# Patient Record
Sex: FEMALE | Race: WHITE | Employment: OTHER | URBAN - METROPOLITAN AREA
[De-identification: names, ages, dates, MRNs, and addresses within clinical notes are randomized per-mention and may not be internally consistent; named-entity substitution may affect disease eponyms.]

---

## 2019-05-22 ENCOUNTER — HOSPITAL ENCOUNTER (OUTPATIENT)
Dept: LAB | Age: 79
Discharge: HOME | End: 2019-05-22
Payer: MEDICARE

## 2019-05-22 PROCEDURE — 88360 TUMOR IMMUNOHISTOCHEM/MANUAL: CPT

## 2019-05-22 PROCEDURE — 88305 TISSUE EXAM BY PATHOLOGIST: CPT

## 2019-05-22 PROCEDURE — 88342 IMHCHEM/IMCYTCHM 1ST ANTB: CPT

## 2019-07-22 ENCOUNTER — HOSPITAL ENCOUNTER (OUTPATIENT)
Dept: PREADMISSION TESTING | Age: 79
Discharge: HOME | End: 2019-07-22
Payer: MEDICARE

## 2019-07-22 VITALS — HEIGHT: 62 IN | BODY MASS INDEX: 23.92 KG/M2 | WEIGHT: 130 LBS

## 2019-07-22 LAB
ALBUMIN SERPL-MCNC: 3.1 G/DL (ref 3.4–5)
ALBUMIN/GLOB SERPL: 1 {RATIO} (ref 0.8–1.7)
ALP SERPL-CCNC: 121 U/L (ref 45–117)
ALT SERPL-CCNC: 19 U/L (ref 13–56)
ANION GAP SERPL CALC-SCNC: 9 MMOL/L (ref 3–18)
AST SERPL-CCNC: 12 U/L (ref 10–38)
ATRIAL RATE: 74 BPM
BILIRUB SERPL-MCNC: 0.3 MG/DL (ref 0.2–1)
BUN SERPL-MCNC: 18 MG/DL (ref 7–18)
BUN/CREAT SERPL: 13 (ref 12–20)
CALCIUM SERPL-MCNC: 8.5 MG/DL (ref 8.5–10.1)
CALCULATED P AXIS, ECG09: 67 DEGREES
CALCULATED R AXIS, ECG10: 41 DEGREES
CALCULATED T AXIS, ECG11: 83 DEGREES
CHLORIDE SERPL-SCNC: 103 MMOL/L (ref 100–111)
CO2 SERPL-SCNC: 23 MMOL/L (ref 21–32)
CREAT SERPL-MCNC: 1.44 MG/DL (ref 0.6–1.3)
DIAGNOSIS, 93000: NORMAL
GLOBULIN SER CALC-MCNC: 3.1 G/DL (ref 2–4)
GLUCOSE SERPL-MCNC: 92 MG/DL (ref 74–99)
HCT VFR BLD AUTO: 36.1 % (ref 35–45)
HGB BLD-MCNC: 12.2 G/DL (ref 12–16)
P-R INTERVAL, ECG05: 200 MS
POTASSIUM SERPL-SCNC: 4 MMOL/L (ref 3.5–5.5)
PROT SERPL-MCNC: 6.2 G/DL (ref 6.4–8.2)
Q-T INTERVAL, ECG07: 400 MS
QRS DURATION, ECG06: 88 MS
QTC CALCULATION (BEZET), ECG08: 444 MS
SODIUM SERPL-SCNC: 135 MMOL/L (ref 136–145)
VENTRICULAR RATE, ECG03: 74 BPM

## 2019-07-22 PROCEDURE — 80053 COMPREHEN METABOLIC PANEL: CPT

## 2019-07-22 PROCEDURE — 85018 HEMOGLOBIN: CPT

## 2019-07-22 PROCEDURE — 93005 ELECTROCARDIOGRAM TRACING: CPT

## 2019-07-22 RX ORDER — AMLODIPINE BESYLATE 5 MG/1
5 TABLET ORAL DAILY
COMMUNITY

## 2019-07-22 RX ORDER — SODIUM CHLORIDE, SODIUM LACTATE, POTASSIUM CHLORIDE, CALCIUM CHLORIDE 600; 310; 30; 20 MG/100ML; MG/100ML; MG/100ML; MG/100ML
125 INJECTION, SOLUTION INTRAVENOUS CONTINUOUS
Status: CANCELLED | OUTPATIENT
Start: 2019-07-22

## 2019-07-22 RX ORDER — ASPIRIN 81 MG/1
81 TABLET ORAL DAILY
COMMUNITY

## 2019-07-22 RX ORDER — MELATONIN
1000 DAILY
COMMUNITY

## 2019-07-22 RX ORDER — ATORVASTATIN CALCIUM 80 MG/1
80 TABLET, FILM COATED ORAL
COMMUNITY

## 2019-07-22 RX ORDER — MIRTAZAPINE 30 MG/1
30 TABLET, FILM COATED ORAL DAILY
COMMUNITY

## 2019-07-22 RX ORDER — DOCUSATE SODIUM 100 MG/1
100 CAPSULE, LIQUID FILLED ORAL AS NEEDED
COMMUNITY

## 2019-07-22 RX ORDER — METOPROLOL SUCCINATE 100 MG/1
100 TABLET, EXTENDED RELEASE ORAL 2 TIMES DAILY
COMMUNITY

## 2019-07-22 NOTE — PERIOP NOTES
Patient does not meet criteria for special pop. No hx of sleep apnea or previous screening. Denies hx of MH. PCP is aware of surgery. Not participating in any research study or clinical trials.  Lab work and EKG done during PAT appt. 7-22-19

## 2019-07-31 ENCOUNTER — ANESTHESIA EVENT (OUTPATIENT)
Dept: SURGERY | Age: 79
End: 2019-07-31
Payer: MEDICARE

## 2019-07-31 NOTE — H&P
PATIENT: Sabiha Morales  YOB: 1940  DATE: 07/09/2019 1:15 PM   VISIT TYPE: Office Visit  HISTORIAN: self  _____________________________________________________________    Completed Orders (this encounter)  Order Interpretation Result Next Lab Date   Dietary management education, guidance, and counseling        Assessment/Plan  # Detail Type Description    1. Assessment Malignant neoplasm of central portion of left female breast (C50.112). Impression ILC L brst, initally measured 2.4 cm, G1, ER/WY + Her2 neg, discussed surgical options, suggest lumpectomy after seeing MRI results, risks and benefits, possible post op RT and/or hormone tx. Patient Plan left brst lumpectomy wire loc, and LN excision         2. Assessment Body mass index (BMI) 22.0-22.9, adult (K71.26). Plan Orders Today's instructions / counseling include(s) Dietary management education, guidance, and counseling. This 78year old female presents for Breast cancer. History of Present Illness:  1. Breast cancer   The initial visit date was 06/10/2019. The date of diagnosis was June 2019. The patient reports a performance level of 100% (Karnofsky Performance Scale). The location includes L upper central. The patient reports the pain level is 0/10. The problem is stable. Initial symptoms include abnl mammo. Self breast exam was performed. Mammogram was performed. Clinical breast exam was performed. Performance status is scored as a 1. The patient is restricted in some physical activity but is able to perform normal activities with effort. Pertinent negatives include bleeding, chest pain, cough, dyspnea, dysuria, fever, headache, night sweats and weight loss. Additional information includes 78year old female being seen today to discuss either a lumpectomy or mastectomy. Patient had a MRI at [facility]. on June 25 which showed minimal enhancement at clip, nl LN's and R brst.              PROBLEM LIST:   Problem List reviewed. Problem Description Onset Date Chronic Clinical Status Notes   Breast Ca  Y     Nuclear senile cataract 11/30/2012 Y     Posterior subcapsular polar senile cataract 11/30/2012 Y     Age related macular degeneration 11/30/2012 Y     Hypo-osmolality and or hyponatremia 08/09/2011 Y     Spinal stenosis of lumbar region 08/09/2011 Y  helps with medrol dosepak   Benign essential hypertension 07/06/2010 Y     Hyperlipidemia 07/06/2010 Y     Tobacco dependence syndrome 07/06/2010 Y     Gastroesophageal reflux 09/02/2014 N     Hiatal hernia 09/02/2014 N     Weight loss 09/02/2014 N     Colonic polyp 09/02/2014 N           Medical/Surgical/Interim History  Reviewed, no change. Last detailed document date:05/28/2019. Family History:  Reviewed, no changes. Last detailed document date:05/28/2019. Social History:  Reviewed, no changes. Last detailed document date: 05/28/2019. Medications (active prior to today)  Medication Instructions Start Date Stop Date Refilled Elsewhere   aspirin 81 mg chewable tablet chew 1 tablet by oral route  every day //   Y   Colace 100 mg capsule take 1 capsule by oral route 2 times every day //   Y   PreserVision AREDS 2 250 mg-200 unit-40 mg-1 mg capsule Take 1 tab by mouth once daily //   Y   Vitamin D3 1,000 unit tablet  12/01/2015 12/01/2015 N   nitroglycerin 0.3 mg sublingual tablet place 1 tablet by sublingual route at the first sign of an attack; no more than 3 tabs are recommended within a 15 minute period.  05/22/2017 05/22/2017 N   Zofran 4 mg tablet take 1 Tablet by oral route  every 6 hours as needed for n/v 06/22/2017 06/22/2017 N   Lubriderm Advanced Therapy lotion  10/16/2017   N   mirtazapine 30 mg tablet take 1 tablet by oral route  every day before bedtime 06/01/2018 06/01/2018 N   amlodipine 5 mg tablet take 1 tablet by oral route once daily 12/17/2018 12/17/2018 N   Toprol  mg tablet,extended release take 1 tablet by ORAL route 2 times every day 03/18/2019 03/18/2019 N   Lipitor 40 mg tablet take 1 tablet by oral route  every day 04/16/2019 04/16/2019 N   Shingrix (PF) 50 mcg/0.5 mL intramuscular suspension, kit inject 0.5 milliliter by intramuscular route as directed 04/16/2019   N   Percocet 5 mg-325 mg tablet take 1 tablet by oral route  every 6 hours as needed for M54.5 04/16/2019 04/16/2019 N   Miralax 17 gram/dose oral powder take (17G)  by oral route  every day mixed with 8 oz. water, juice, soda, coffee or tea 05/28/2019   N     Medication Reconciliation  Medications reconciled today. Medication Reviewed  Adherence Medication Name Sig Desc Elsewhere Status   taking as directed Lubriderm Advanced Therapy lotion  N Verified   taking as directed Vitamin D3 1,000 unit tablet  N Verified   taking as directed mirtazapine 30 mg tablet take 1 tablet by oral route  every day before bedtime N Verified   taking as directed Zofran 4 mg tablet take 1 Tablet by oral route  every 6 hours as needed for n/v N Verified   taking as directed PreserVision AREDS 2 250 mg-200 unit-40 mg-1 mg capsule Take 1 tab by mouth once daily Y Verified   taking as directed Colace 100 mg capsule take 1 capsule by oral route 2 times every day Y Verified   taking as directed aspirin 81 mg chewable tablet chew 1 tablet by oral route  every day Y Verified   taking as directed Lipitor 40 mg tablet take 1 tablet by oral route  every day N Verified   taking as directed Toprol  mg tablet,extended release take 1 tablet by ORAL route 2 times every day N Verified   taking as directed nitroglycerin 0.3 mg sublingual tablet place 1 tablet by sublingual route at the first sign of an attack; no more than 3 tabs are recommended within a 15 minute period.  N Verified   taking as directed amlodipine 5 mg tablet take 1 tablet by oral route once daily N Verified   taking as directed Shingrix (PF) 50 mcg/0.5 mL intramuscular suspension, kit inject 0.5 milliliter by intramuscular route as directed N Verified   taking as directed Percocet 5 mg-325 mg tablet take 1 tablet by oral route  every 6 hours as needed for M54.5 N Verified   taking as directed Miralax 17 gram/dose oral powder take (17G)  by oral route  every day mixed with 8 oz. water, juice, soda, coffee or tea N Verified     Allergies:  Ingredient Reaction (Severity) Medication Name Comment   ACETAMINOPHEN  Vicodin    HYDROCODONE BITARTRATE  Vicodin    NSAIDS (NON-STEROIDAL ANTI-INFLAMMATORY DRUG)      Reviewed, no changes. Review of Systems  System Neg/Pos Details   Constitutional Negative Fever, Night sweats and Weight loss. ENMT Negative Hearing loss, Tinnitus, Vertigo and Voice change. Eyes Negative Diplopia and Vision loss. Respiratory Negative Asthma, Cough, Dyspnea, Hemoptysis, Known TB exposure and Wheezing. Cardio Negative Chest pain, Claudication, Edema, Irregular heartbeat/palpitations and Thrombophlebitis. GI Negative Bloating, Dysphagia, Hemorrhoids, Jaundice and Reflux.  Negative Dysuria, Nocturia, Passage stone/gravel and Urinary incontinence. Endocrine Negative Cold intolerance and Goiter. Neuro Negative Headache and Syncope. Integumentary Negative Change in shape/size of mole(s) and Skin lesion. MS Negative Back pain and Bone/joint symptoms. Hema/Lymph Negative Easy bleeding and Easy bruising. Allergic/Immuno Negative Contact allergy and Contact dermatitis.      Vital Signs     Height  Time ft in cm Last Measured Height Position   1:24 PM 5.0 4.00 162.56 07/09/2019 Standing     Weight/BSA/BMI  Time lb oz kg Context BMI kg/m2 BSA m2   1:24 .80  58.876 dressed with shoes 22.28      Blood Pressure  Time BP mm/Hg Position Side Site Method Cuff Size   1:24 /74 sitting right arm manual adult large     Temperature/Pulse/Respiration  Time Temp F Temp C Temp Site Pulse/min Pattern Resp/ min   1:24 PM 97.60 36.44 oral 74 regular 16     Pulse Oximetry/FIO2  Time Pulse Ox (Rest %) Pulse Ox (Amb %) O2 Sat O2 L/Min Timing FiO2 % L/min Delivery Method Finger Probe   1:24 PM 98  RA      L Index     Pain Scale  Time Pain Score Method   1:24 PM 0/10 Numeric Pain Intensity Scale     Measured By  Time Measured by   1:24 PM Raulito Franco       Physical Exam:  Exam Findings Details   Constitutional Normal No acute distress. Well nourished. Well developed. Eyes Normal General - Right: Normal, Left: Normal. Sclera - Right: Normal, Left: Normal.   Neck Exam Comments LN's had been normal.   Neck Exam Normal Inspection - Normal.   Lymph Detail Comments normal cervical and axillary LN's. Lymph Detail Normal Anterior cervical. Posterior cervical.   Breast Comments cancer is not palpable   Respiratory Normal Cough - Absent. Effort - Normal.   Cardiovascular Normal Inspection - JVD: Absent. Heart rate - Regular rate. Rhythm - Regular. Skin * Inspection - General inspection: warm and dry. Musculoskeletal Normal Visual overview of all four extremities is normal. Gait - Normal.   Extremity Normal No Cyanosis. No Edema. Neurological Normal Level of consciousness - Normal. Orientation - Normal. Memory - Normal.   Psychiatric Normal Orientation - Oriented to time, place, person & situation. No agitation. Not anxious. Appropriate mood and affect.          Medications (added, continued, or stopped this visit):  Start Date Medication Directions PRN Status PRN Reason Instruction Stop Date   12/17/2018 amlodipine 5 mg tablet take 1 tablet by oral route once daily N       aspirin 81 mg chewable tablet chew 1 tablet by oral route  every day N       Colace 100 mg capsule take 1 capsule by oral route 2 times every day N      04/16/2019 Lipitor 40 mg tablet take 1 tablet by oral route  every day N      10/16/2017 Lubriderm Advanced Therapy lotion  N      05/28/2019 Miralax 17 gram/dose oral powder take (17G)  by oral route  every day mixed with 8 oz. water, juice, soda, coffee or tea N      06/01/2018 mirtazapine 30 mg tablet take 1 tablet by oral route  every day before bedtime N      05/22/2017 nitroglycerin 0.3 mg sublingual tablet place 1 tablet by sublingual route at the first sign of an attack; no more than 3 tabs are recommended within a 15 minute period.  N      04/16/2019 Percocet 5 mg-325 mg tablet take 1 tablet by oral route  every 6 hours as needed for M54.5 N M54.5      PreserVision AREDS 2 250 mg-200 unit-40 mg-1 mg capsule Take 1 tab by mouth once daily N      04/16/2019 Shingrix (PF) 50 mcg/0.5 mL intramuscular suspension, kit inject 0.5 milliliter by intramuscular route as directed N      03/18/2019 Toprol  mg tablet,extended release take 1 tablet by ORAL route 2 times every day N      12/01/2015 Vitamin D3 1,000 unit tablet  N      06/22/2017 Zofran 4 mg tablet take 1 Tablet by oral route  every 6 hours as needed for n/v N          Active Patient Care Team Members    Name Contact Agency Type Support Role Relationship Active Date Inactive Date [address]       Orthopedic Surgery   [place] [address]   Patient provider PCP   Internal Medicine   Paulo Green   encounter provider    Physical Therapy   Amaryllis Matt   encounter provider    Gastroenterology   Elenaderella Amen   encounter provider    Physicians Assistant       Provider: Jose Coelho MD 07/09/2019 01:15 PM  Document generated by:  Jose Coelho 07/10/2019 12:13 PM                           Electronically signed by Jose Coelho MD on 07/18/2019 07:20 PM

## 2019-08-01 ENCOUNTER — ANESTHESIA (OUTPATIENT)
Dept: SURGERY | Age: 79
End: 2019-08-01
Payer: MEDICARE

## 2019-08-01 ENCOUNTER — HOSPITAL ENCOUNTER (OUTPATIENT)
Age: 79
Setting detail: OUTPATIENT SURGERY
Discharge: HOME | End: 2019-08-01
Attending: SURGERY | Admitting: SURGERY
Payer: MEDICARE

## 2019-08-01 ENCOUNTER — APPOINTMENT (OUTPATIENT)
Dept: NUCLEAR MEDICINE | Age: 79
End: 2019-08-01
Attending: SURGERY
Payer: MEDICARE

## 2019-08-01 VITALS
OXYGEN SATURATION: 96 % | RESPIRATION RATE: 16 BRPM | SYSTOLIC BLOOD PRESSURE: 178 MMHG | DIASTOLIC BLOOD PRESSURE: 70 MMHG | HEIGHT: 62 IN | BODY MASS INDEX: 23.61 KG/M2 | HEART RATE: 87 BPM | TEMPERATURE: 97.1 F | WEIGHT: 128.31 LBS

## 2019-08-01 DIAGNOSIS — Z85.3 HISTORY OF LEFT BREAST CANCER: ICD-10-CM

## 2019-08-01 PROCEDURE — 74011250636 HC RX REV CODE- 250/636: Performed by: SPECIALIST

## 2019-08-01 PROCEDURE — 74011250636 HC RX REV CODE- 250/636

## 2019-08-01 PROCEDURE — 77030002933 HC SUT MCRYL J&J -A: Performed by: SURGERY

## 2019-08-01 PROCEDURE — 74011250637 HC RX REV CODE- 250/637: Performed by: SPECIALIST

## 2019-08-01 PROCEDURE — 74011000250 HC RX REV CODE- 250: Performed by: SURGERY

## 2019-08-01 PROCEDURE — 74011000250 HC RX REV CODE- 250

## 2019-08-01 PROCEDURE — 74011250636 HC RX REV CODE- 250/636: Performed by: SURGERY

## 2019-08-01 PROCEDURE — 77030002996 HC SUT SLK J&J -A: Performed by: SURGERY

## 2019-08-01 PROCEDURE — 76210000063 HC OR PH I REC FIRST 0.5 HR: Performed by: SURGERY

## 2019-08-01 PROCEDURE — 88342 IMHCHEM/IMCYTCHM 1ST ANTB: CPT

## 2019-08-01 PROCEDURE — 77030011267 HC ELECTRD BLD COVD -A: Performed by: SURGERY

## 2019-08-01 PROCEDURE — 77030040361 HC SLV COMPR DVT MDII -B: Performed by: SURGERY

## 2019-08-01 PROCEDURE — 77030018836 HC SOL IRR NACL ICUM -A: Performed by: SURGERY

## 2019-08-01 PROCEDURE — 77030012508 HC MSK AIRWY LMA AMBU -A: Performed by: SPECIALIST

## 2019-08-01 PROCEDURE — 88305 TISSUE EXAM BY PATHOLOGIST: CPT

## 2019-08-01 PROCEDURE — 77030010512 HC APPL CLP LIG J&J -C: Performed by: SURGERY

## 2019-08-01 PROCEDURE — 88307 TISSUE EXAM BY PATHOLOGIST: CPT

## 2019-08-01 PROCEDURE — 76210000021 HC REC RM PH II 0.5 TO 1 HR: Performed by: SURGERY

## 2019-08-01 PROCEDURE — 76010000138 HC OR TIME 0.5 TO 1 HR: Performed by: SURGERY

## 2019-08-01 PROCEDURE — 76060000032 HC ANESTHESIA 0.5 TO 1 HR: Performed by: SURGERY

## 2019-08-01 PROCEDURE — 77030020782 HC GWN BAIR PAWS FLX 3M -B: Performed by: SURGERY

## 2019-08-01 PROCEDURE — A9541 TC99M SULFUR COLLOID: HCPCS

## 2019-08-01 RX ORDER — GLYCOPYRROLATE 0.2 MG/ML
INJECTION INTRAMUSCULAR; INTRAVENOUS AS NEEDED
Status: DISCONTINUED | OUTPATIENT
Start: 2019-08-01 | End: 2019-08-01 | Stop reason: HOSPADM

## 2019-08-01 RX ORDER — OXYCODONE AND ACETAMINOPHEN 5; 325 MG/1; MG/1
1 TABLET ORAL
Qty: 20 TAB | Refills: 0 | Status: SHIPPED | OUTPATIENT
Start: 2019-08-01 | End: 2019-08-04

## 2019-08-01 RX ORDER — ONDANSETRON 2 MG/ML
4 INJECTION INTRAMUSCULAR; INTRAVENOUS ONCE
Status: DISCONTINUED | OUTPATIENT
Start: 2019-08-01 | End: 2019-08-01 | Stop reason: HOSPADM

## 2019-08-01 RX ORDER — FENTANYL CITRATE 50 UG/ML
25 INJECTION, SOLUTION INTRAMUSCULAR; INTRAVENOUS AS NEEDED
Status: DISCONTINUED | OUTPATIENT
Start: 2019-08-01 | End: 2019-08-01 | Stop reason: HOSPADM

## 2019-08-01 RX ORDER — LIDOCAINE HYDROCHLORIDE 20 MG/ML
INJECTION, SOLUTION EPIDURAL; INFILTRATION; INTRACAUDAL; PERINEURAL AS NEEDED
Status: DISCONTINUED | OUTPATIENT
Start: 2019-08-01 | End: 2019-08-01 | Stop reason: HOSPADM

## 2019-08-01 RX ORDER — FENTANYL CITRATE 50 UG/ML
50 INJECTION, SOLUTION INTRAMUSCULAR; INTRAVENOUS
Status: DISCONTINUED | OUTPATIENT
Start: 2019-08-01 | End: 2019-08-01 | Stop reason: HOSPADM

## 2019-08-01 RX ORDER — FENTANYL CITRATE 50 UG/ML
INJECTION, SOLUTION INTRAMUSCULAR; INTRAVENOUS AS NEEDED
Status: DISCONTINUED | OUTPATIENT
Start: 2019-08-01 | End: 2019-08-01 | Stop reason: HOSPADM

## 2019-08-01 RX ORDER — ACETAMINOPHEN 500 MG
1000 TABLET ORAL ONCE
Status: COMPLETED | OUTPATIENT
Start: 2019-08-01 | End: 2019-08-01

## 2019-08-01 RX ORDER — MIDAZOLAM HYDROCHLORIDE 1 MG/ML
INJECTION, SOLUTION INTRAMUSCULAR; INTRAVENOUS AS NEEDED
Status: DISCONTINUED | OUTPATIENT
Start: 2019-08-01 | End: 2019-08-01 | Stop reason: HOSPADM

## 2019-08-01 RX ORDER — MAGNESIUM SULFATE 100 %
4 CRYSTALS MISCELLANEOUS AS NEEDED
Status: DISCONTINUED | OUTPATIENT
Start: 2019-08-01 | End: 2019-08-01 | Stop reason: HOSPADM

## 2019-08-01 RX ORDER — KETOROLAC TROMETHAMINE 30 MG/ML
15 INJECTION, SOLUTION INTRAMUSCULAR; INTRAVENOUS
Status: DISCONTINUED | OUTPATIENT
Start: 2019-08-01 | End: 2019-08-01 | Stop reason: HOSPADM

## 2019-08-01 RX ORDER — SODIUM CHLORIDE, SODIUM LACTATE, POTASSIUM CHLORIDE, CALCIUM CHLORIDE 600; 310; 30; 20 MG/100ML; MG/100ML; MG/100ML; MG/100ML
125 INJECTION, SOLUTION INTRAVENOUS CONTINUOUS
Status: DISCONTINUED | OUTPATIENT
Start: 2019-08-01 | End: 2019-08-01 | Stop reason: HOSPADM

## 2019-08-01 RX ORDER — NALOXONE HYDROCHLORIDE 0.4 MG/ML
0.1 INJECTION, SOLUTION INTRAMUSCULAR; INTRAVENOUS; SUBCUTANEOUS AS NEEDED
Status: DISCONTINUED | OUTPATIENT
Start: 2019-08-01 | End: 2019-08-01 | Stop reason: HOSPADM

## 2019-08-01 RX ORDER — HYDROMORPHONE HYDROCHLORIDE 2 MG/ML
0.2 INJECTION, SOLUTION INTRAMUSCULAR; INTRAVENOUS; SUBCUTANEOUS
Status: DISCONTINUED | OUTPATIENT
Start: 2019-08-01 | End: 2019-08-01 | Stop reason: HOSPADM

## 2019-08-01 RX ORDER — ONDANSETRON 2 MG/ML
INJECTION INTRAMUSCULAR; INTRAVENOUS AS NEEDED
Status: DISCONTINUED | OUTPATIENT
Start: 2019-08-01 | End: 2019-08-01 | Stop reason: HOSPADM

## 2019-08-01 RX ORDER — PROPOFOL 10 MG/ML
INJECTION, EMULSION INTRAVENOUS AS NEEDED
Status: DISCONTINUED | OUTPATIENT
Start: 2019-08-01 | End: 2019-08-01 | Stop reason: HOSPADM

## 2019-08-01 RX ORDER — SODIUM CHLORIDE 9 MG/ML
125 INJECTION, SOLUTION INTRAVENOUS CONTINUOUS
Status: DISCONTINUED | OUTPATIENT
Start: 2019-08-01 | End: 2019-08-01 | Stop reason: HOSPADM

## 2019-08-01 RX ORDER — OXYCODONE AND ACETAMINOPHEN 5; 325 MG/1; MG/1
1 TABLET ORAL AS NEEDED
Status: DISCONTINUED | OUTPATIENT
Start: 2019-08-01 | End: 2019-08-01 | Stop reason: HOSPADM

## 2019-08-01 RX ORDER — DEXAMETHASONE SODIUM PHOSPHATE 4 MG/ML
4 INJECTION, SOLUTION INTRA-ARTICULAR; INTRALESIONAL; INTRAMUSCULAR; INTRAVENOUS; SOFT TISSUE ONCE
Status: COMPLETED | OUTPATIENT
Start: 2019-08-01 | End: 2019-08-01

## 2019-08-01 RX ORDER — BUPIVACAINE HYDROCHLORIDE 5 MG/ML
INJECTION, SOLUTION EPIDURAL; INTRACAUDAL AS NEEDED
Status: DISCONTINUED | OUTPATIENT
Start: 2019-08-01 | End: 2019-08-01 | Stop reason: HOSPADM

## 2019-08-01 RX ADMIN — ACETAMINOPHEN 1000 MG: 500 TABLET ORAL at 12:01

## 2019-08-01 RX ADMIN — SODIUM CHLORIDE, SODIUM LACTATE, POTASSIUM CHLORIDE, AND CALCIUM CHLORIDE 125 ML/HR: 600; 310; 30; 20 INJECTION, SOLUTION INTRAVENOUS at 12:01

## 2019-08-01 RX ADMIN — SODIUM CHLORIDE, SODIUM LACTATE, POTASSIUM CHLORIDE, AND CALCIUM CHLORIDE 1000 ML: 600; 310; 30; 20 INJECTION, SOLUTION INTRAVENOUS at 12:04

## 2019-08-01 RX ADMIN — FENTANYL CITRATE 100 MCG: 50 INJECTION, SOLUTION INTRAMUSCULAR; INTRAVENOUS at 14:42

## 2019-08-01 RX ADMIN — MIDAZOLAM HYDROCHLORIDE 2 MG: 1 INJECTION, SOLUTION INTRAMUSCULAR; INTRAVENOUS at 14:47

## 2019-08-01 RX ADMIN — LIDOCAINE HYDROCHLORIDE 60 MG: 20 INJECTION, SOLUTION EPIDURAL; INFILTRATION; INTRACAUDAL; PERINEURAL at 14:42

## 2019-08-01 RX ADMIN — ONDANSETRON 4 MG: 2 INJECTION INTRAMUSCULAR; INTRAVENOUS at 14:44

## 2019-08-01 RX ADMIN — GLYCOPYRROLATE 0.2 MG: 0.2 INJECTION INTRAMUSCULAR; INTRAVENOUS at 14:47

## 2019-08-01 RX ADMIN — DEXAMETHASONE SODIUM PHOSPHATE 4 MG: 4 INJECTION, SOLUTION INTRAMUSCULAR; INTRAVENOUS at 12:02

## 2019-08-01 RX ADMIN — PROPOFOL 150 MG: 10 INJECTION, EMULSION INTRAVENOUS at 14:42

## 2019-08-01 NOTE — ANESTHESIA POSTPROCEDURE EVALUATION
Post-Anesthesia Evaluation and Assessment    Cardiovascular Function/Vital Signs  Visit Vitals  /64   Pulse 84   Temp 36.1 °C (97 °F)   Resp 10   Ht 5' 2\" (1.575 m)   Wt 58.2 kg (128 lb 5 oz)   SpO2 98%   BMI 23.47 kg/m²       Patient is status post Procedure(s):  LEFT BREAST LUMPECTOMY AND EXCISION LEFT AXILLARY LYMPH NODES. Nausea/Vomiting: Controlled. Postoperative hydration reviewed and adequate. Pain:  Pain Scale 1: Numeric (0 - 10) (08/01/19 1549)  Pain Intensity 1: 0 (08/01/19 1549)   Managed. Neurological Status:   Neuro (WDL): Exceptions to WDL(unsteady gait) (08/01/19 1155)   At baseline. Mental Status and Level of Consciousness: Arousable. Pulmonary Status:   O2 Device: Nasal cannula (08/01/19 2769)   Adequate oxygenation and airway patent. Complications related to anesthesia: None    Post-anesthesia assessment completed. No concerns. Patient has met all discharge requirements.     Signed By: Isabel Issa CRNA    August 1, 2019

## 2019-08-01 NOTE — INTERVAL H&P NOTE
H&P Update: 
Niurka Montano was seen and examined. History and physical has been reviewed. The patient has been examined. There have been no significant clinical changes since the completion of the originally dated History and Physical. 
Patient identified by surgeon; surgical site was confirmed by patient and surgeon.

## 2019-08-01 NOTE — ANESTHESIA PREPROCEDURE EVALUATION
Relevant Problems   No relevant active problems       Anesthetic History   No history of anesthetic complications     Pertinent negatives: No PONV  Comments: Colonoscopy EGD -> shaking     Review of Systems / Medical History  Patient summary reviewed, nursing notes reviewed and pertinent labs reviewed    Pulmonary          Smoker (did not smoke today)    Pertinent negatives: No COPD and asthma     Neuro/Psych       CVA: no residual symptoms  TIA and psychiatric history     Cardiovascular    Hypertension: well controlled          CAD and cardiac stents      Comments: LICA 57-29% stenosis    mesenteric ischemia. 70% stenosis. Moderate to severe inferior and inferolateral ischemia. Deemed not a candidate for CABG due to unacceptable risk of mesenteric ischemia/ infarction. Moderate diastolic dysfunction   GI/Hepatic/Renal  Within defined limits           Pertinent negatives: No GERD, liver disease and renal disease   Endo/Other        Cancer and anemia  Pertinent negatives: No diabetes   Other Findings   Comments: etoh neg         Physical Exam    Airway  Mallampati: III  TM Distance: 4 - 6 cm  Neck ROM: decreased range of motion   Mouth opening: Diminished (comment)     Cardiovascular               Dental    Dentition: Upper partial plate  Comments: Poor dentition, kevin lower front. Pulmonary                 Abdominal         Other Findings            Anesthetic Plan    ASA: 4  Anesthesia type: general          Induction: Intravenous  Anesthetic plan and risks discussed with: Patient      Have IV NTG nearby.

## 2019-08-01 NOTE — ROUTINE PROCESS
TRANSFER - OUT REPORT: 
 
Verbal report given to Sheryl Long on Deja Viveros  being transferred to radiology) for ordered procedure Report consisted of patients Situation, Background, Assessment and  
Recommendations(SBAR). Information from the following report(s) SBAR was reviewed with the receiving nurse. Lines:  
Peripheral IV 08/01/19 Right Hand (Active) Site Assessment Clean, dry, & intact 8/1/2019 12:00 PM  
Phlebitis Assessment 0 8/1/2019 12:00 PM  
Infiltration Assessment 0 8/1/2019 12:00 PM  
Dressing Status Clean, dry, & intact 8/1/2019 12:00 PM  
Dressing Type Tape;Transparent 8/1/2019 12:00 PM  
Hub Color/Line Status Pink; Infusing;Patent 8/1/2019 12:00 PM  
  
 
Opportunity for questions and clarification was provided. Patient transported with: 
 [employer]

## 2019-08-01 NOTE — INTERVAL H&P NOTE
H&P Update: 
Deja Viveros was seen and examined. History and physical has been reviewed. The patient has been examined. There have been no significant clinical changes since the completion of the originally dated History and Physical. 
Patient identified by surgeon; surgical site was confirmed by patient and surgeon.

## 2019-08-01 NOTE — DISCHARGE INSTRUCTIONS
DISCHARGE SUMMARY from Nurse    PATIENT INSTRUCTIONS:    After general anesthesia or intravenous sedation, for 24 hours or while taking prescription Narcotics:  · Limit your activities  · Do not drive and operate hazardous machinery  · Do not make important personal or business decisions  · Do  not drink alcoholic beverages  · If you have not urinated within 8 hours after discharge, please contact your surgeon on call. Report the following to your surgeon:  · Excessive pain, swelling, redness or odor of or around the surgical area  · Temperature over 100.5  · Nausea and vomiting lasting longer than 4 hours or if unable to take medications  · Any signs of decreased circulation or nerve impairment to extremity: change in color, persistent  numbness, tingling, coldness or increase pain  · Any questions    What to do at Home:  Recommended activity: Activity as tolerated and Ambulate in house. If you experience any of the following symptoms as highlighted above, please follow up with Dr. Vitale Ask. Regular diet as tolerated. Increase fluid intake at home. Dressing may be removed in 2-days. You may shower in 2-days, but do not soak in water; no pools, baths, or hot tubs. No heavy lifting or strenuous activity with left arm. Use an ice pack to surgical site to reduce pain and swelling. If using narcotic pain medication, take a stool softener to prevent constipation. Follow up with Dr. Vitale Ask in 1-week. *  Please give a list of your current medications to your Primary Care Provider. *  Please update this list whenever your medications are discontinued, doses are      changed, or new medications (including over-the-counter products) are added. *  Please carry medication information at all times in case of emergency situations.     These are general instructions for a healthy lifestyle:    No smoking/ No tobacco products/ Avoid exposure to second hand smoke  Surgeon General's Warning:  Quitting smoking now greatly reduces serious risk to your health. Obesity, smoking, and sedentary lifestyle greatly increases your risk for illness    A healthy diet, regular physical exercise & weight monitoring are important for maintaining a healthy lifestyle    You may be retaining fluid if you have a history of heart failure or if you experience any of the following symptoms:  Weight gain of 3 pounds or more overnight or 5 pounds in a week, increased swelling in our hands or feet or shortness of breath while lying flat in bed. Please call your doctor as soon as you notice any of these symptoms; do not wait until your next office visit. The discharge information has been reviewed with the patient and caregiver. The patient and caregiver verbalized understanding. Discharge medications reviewed with the patient and caregiver and appropriate educational materials and side effects teaching were provided. Patient armband removed and shredded.     ___________________________________________________________________________________________________________________________________

## 2019-08-01 NOTE — PERIOP NOTES
Reviewed PTA medication list with patient/caregiver and patient/caregiver denies any additional medications. Patient admits to having a responsible adult care for them for at least 24 hours after surgery.     Permission granted by patient for a dual skin assessment. Dual skin assessment completed by Moni Ernst RN and Ricky Perez.

## 2019-08-01 NOTE — PERIOP NOTES
Report given to Kinza Rm, [facility]. Patient transferring to Phase II for discharge.

## 2019-08-02 NOTE — OP NOTES
[facility] [place]  OPERATIVE REPORT    Name:  Ashanti Gutierrez  MR#:   [id number]  :  1940  ACCOUNT #:  [de-identified]  DATE OF SERVICE:  2019    PREOPERATIVE DIAGNOSIS:  Left breast cancer. POSTOPERATIVE DIAGNOSIS:  Left breast cancer. PROCEDURE PERFORMED:  Left breast lumpectomy and excision of left axillary lymph nodes. SURGEON:  Scott Serrano MD    FIRST ASSISTANT:  Billie Bell. ANESTHESIA:  General and local.    COMPLICATIONS:  No complications. SPECIMENS REMOVED:  Left breast lumpectomy with inferior margin and left axillary lymph nodes. DRAINS:  No drains. IMPLANTS:  No implants. ESTIMATED BLOOD LOSS:  3 mL. INDICATIONS FOR PROCEDURE:  This is a 68-year-old female with an abnormal mammogram and a biopsy showing invasive lobular cancer. It measured 2.2 cm by MRI. She is brought to the operating room for lumpectomy and excision of lymph nodes. DESCRIPTION OF PROCEDURE:  Prior to coming to the operating room, the patient had wire localization of the breast mass. Nuclear medicine was injected as well. The patient was brought to the operating room, placed on the table in a supine position. After placing monitors and adequate general anesthesia, the left breast and axilla were prepped and draped in the usual sterile fashion. SCD hoses were placed preoperatively; 3 mL of Lymphazurin was injected in the dermis in the left upper outer nipple-areolar complex. Incision was made in the left upper outer quadrant of the breast through the skin down to subcutaneous tissue. The localization wire was identified and brought through the incision. The breast tissue surrounding the wire was excised with the electrocautery. It was marked with a short suture at the anterior margin and the long suture was the lateral margin. It was sent to Pathology for gross margins. They were grossly clear by 3 mm.   An additional inferior margin was taken and sent to Pathology for permanent sectioning. The silk suture was placed on the new outer margin. Marcaine was injected locally. There was good hemostasis with the electrocautery. Subcutaneous tissue was reapproximated with interrupted 3-0 Monocryl suture, and 4-0 Monocryl was used to reapproximate the skin. An incision was made in the left axilla through the skin down to subcutaneous tissue. Then, using electrocautery dissection, the lymphatic channels were identified, clipped, and divided. Blue lymph nodes were dissected out. They were radioactive. They were sent to Pathology in formalin. There was good hemostasis with the electrocautery. Subcutaneous tissue was reapproximated with interrupted 3-0 Monocryl suture, and 4-0 Monocryl was used to reapproximate the skin. Steri-Strips were applied and the wounds were dressed sterilely. The sponge, instrument, and needle count was correct at the end of procedure.       Sidra Villareal MD      [id number]  D:  08/01/2019 20:52  T:  08/01/2019 21:00  JOB #:  [id number]

## 2019-09-10 PROBLEM — C50.919 BREAST CANCER (HCC): Status: ACTIVE | Noted: 2019-09-10

## 2021-01-06 ENCOUNTER — HOSPITAL ENCOUNTER (OUTPATIENT)
Dept: LAB | Age: 81
Discharge: HOME | End: 2021-01-06
Payer: MEDICARE

## 2021-01-06 PROCEDURE — 88342 IMHCHEM/IMCYTCHM 1ST ANTB: CPT

## 2021-01-06 PROCEDURE — 88305 TISSUE EXAM BY PATHOLOGIST: CPT

## 2021-01-06 PROCEDURE — 88360 TUMOR IMMUNOHISTOCHEM/MANUAL: CPT

## 2021-01-07 PROCEDURE — 88305 TISSUE EXAM BY PATHOLOGIST: CPT

## 2021-02-03 ENCOUNTER — TRANSCRIBE ORDER (OUTPATIENT)
Dept: SCHEDULING | Age: 81
End: 2021-02-03

## 2021-02-03 DIAGNOSIS — C50.112 MALIGNANT NEOPLASM OF CENTRAL PORTION OF LEFT FEMALE BREAST (HCC): Primary | ICD-10-CM

## 2021-02-09 ENCOUNTER — HOSPITAL ENCOUNTER (OUTPATIENT)
Dept: PREADMISSION TESTING | Age: 81
Discharge: HOME | End: 2021-02-09
Payer: MEDICARE

## 2021-02-09 ENCOUNTER — TRANSCRIBE ORDER (OUTPATIENT)
Dept: REGISTRATION | Age: 81
End: 2021-02-09

## 2021-02-09 DIAGNOSIS — C50.912 MALIGNANT NEOPLASM OF LEFT BREAST (HCC): Primary | ICD-10-CM

## 2021-02-09 DIAGNOSIS — C50.912 MALIGNANT NEOPLASM OF LEFT BREAST (HCC): ICD-10-CM

## 2021-02-09 LAB
ALBUMIN SERPL-MCNC: 2.8 G/DL (ref 3.4–5)
ALBUMIN/GLOB SERPL: 0.8 {RATIO} (ref 0.8–1.7)
ALP SERPL-CCNC: 126 U/L (ref 45–117)
ALT SERPL-CCNC: 21 U/L (ref 13–56)
ANION GAP SERPL CALC-SCNC: 7 MMOL/L (ref 3–18)
AST SERPL-CCNC: 13 U/L (ref 10–38)
BASOPHILS # BLD: 0 K/UL (ref 0–0.1)
BASOPHILS NFR BLD: 0 % (ref 0–2)
BILIRUB SERPL-MCNC: 0.3 MG/DL (ref 0.2–1)
BUN SERPL-MCNC: 14 MG/DL (ref 7–18)
BUN/CREAT SERPL: 12 (ref 12–20)
CALCIUM SERPL-MCNC: 8.8 MG/DL (ref 8.5–10.1)
CHLORIDE SERPL-SCNC: 86 MMOL/L (ref 100–111)
CO2 SERPL-SCNC: 30 MMOL/L (ref 21–32)
CREAT SERPL-MCNC: 1.15 MG/DL (ref 0.6–1.3)
DIFFERENTIAL METHOD BLD: ABNORMAL
EOSINOPHIL # BLD: 0.2 K/UL (ref 0–0.4)
EOSINOPHIL NFR BLD: 2 % (ref 0–5)
ERYTHROCYTE [DISTWIDTH] IN BLOOD BY AUTOMATED COUNT: 13.3 % (ref 11.6–14.5)
GLOBULIN SER CALC-MCNC: 3.6 G/DL (ref 2–4)
GLUCOSE SERPL-MCNC: 103 MG/DL (ref 74–99)
HCT VFR BLD AUTO: 34.9 % (ref 35–45)
HGB BLD-MCNC: 12.2 G/DL (ref 12–16)
LYMPHOCYTES # BLD: 1.6 K/UL (ref 0.9–3.6)
LYMPHOCYTES NFR BLD: 16 % (ref 21–52)
MCH RBC QN AUTO: 32 PG (ref 24–34)
MCHC RBC AUTO-ENTMCNC: 35 G/DL (ref 31–37)
MCV RBC AUTO: 91.6 FL (ref 74–97)
MONOCYTES # BLD: 0.9 K/UL (ref 0.05–1.2)
MONOCYTES NFR BLD: 9 % (ref 3–10)
NEUTS SEG # BLD: 7.1 K/UL (ref 1.8–8)
NEUTS SEG NFR BLD: 73 % (ref 40–73)
PLATELET # BLD AUTO: 256 K/UL (ref 135–420)
PMV BLD AUTO: 9.4 FL (ref 9.2–11.8)
POTASSIUM SERPL-SCNC: 3.2 MMOL/L (ref 3.5–5.5)
PROT SERPL-MCNC: 6.4 G/DL (ref 6.4–8.2)
RBC # BLD AUTO: 3.81 M/UL (ref 4.2–5.3)
SODIUM SERPL-SCNC: 123 MMOL/L (ref 136–145)
WBC # BLD AUTO: 9.8 K/UL (ref 4.6–13.2)

## 2021-02-09 PROCEDURE — 36415 COLL VENOUS BLD VENIPUNCTURE: CPT

## 2021-02-09 PROCEDURE — 80053 COMPREHEN METABOLIC PANEL: CPT

## 2021-02-09 PROCEDURE — 85025 COMPLETE CBC W/AUTO DIFF WBC: CPT

## 2021-02-17 ENCOUNTER — HOSPITAL ENCOUNTER (OUTPATIENT)
Dept: PREADMISSION TESTING | Age: 81
Discharge: HOME | End: 2021-02-17
Payer: MEDICARE

## 2021-02-17 ENCOUNTER — TRANSCRIBE ORDER (OUTPATIENT)
Dept: REGISTRATION | Age: 81
End: 2021-02-17

## 2021-02-17 DIAGNOSIS — Z90.10 STEWART-TREVES SYNDROME (HCC): ICD-10-CM

## 2021-02-17 DIAGNOSIS — C50.919 STEWART-TREVES SYNDROME (HCC): Primary | ICD-10-CM

## 2021-02-17 DIAGNOSIS — C50.919 STEWART-TREVES SYNDROME (HCC): ICD-10-CM

## 2021-02-17 DIAGNOSIS — Z90.10 STEWART-TREVES SYNDROME (HCC): Primary | ICD-10-CM

## 2021-02-17 LAB
ALBUMIN SERPL-MCNC: 2.8 G/DL (ref 3.4–5)
ALBUMIN/GLOB SERPL: 0.8 {RATIO} (ref 0.8–1.7)
ALP SERPL-CCNC: 124 U/L (ref 45–117)
ALT SERPL-CCNC: 20 U/L (ref 13–56)
ANION GAP SERPL CALC-SCNC: 8 MMOL/L (ref 3–18)
AST SERPL-CCNC: 13 U/L (ref 10–38)
BASOPHILS # BLD: 0 K/UL (ref 0–0.1)
BASOPHILS NFR BLD: 0 % (ref 0–2)
BILIRUB SERPL-MCNC: 0.3 MG/DL (ref 0.2–1)
BUN SERPL-MCNC: 20 MG/DL (ref 7–18)
BUN/CREAT SERPL: 16 (ref 12–20)
CALCIUM SERPL-MCNC: 8.7 MG/DL (ref 8.5–10.1)
CHLORIDE SERPL-SCNC: 84 MMOL/L (ref 100–111)
CO2 SERPL-SCNC: 31 MMOL/L (ref 21–32)
CREAT SERPL-MCNC: 1.25 MG/DL (ref 0.6–1.3)
DIFFERENTIAL METHOD BLD: ABNORMAL
EOSINOPHIL # BLD: 0.1 K/UL (ref 0–0.4)
EOSINOPHIL NFR BLD: 1 % (ref 0–5)
ERYTHROCYTE [DISTWIDTH] IN BLOOD BY AUTOMATED COUNT: 13.4 % (ref 11.6–14.5)
GLOBULIN SER CALC-MCNC: 3.4 G/DL (ref 2–4)
GLUCOSE SERPL-MCNC: 116 MG/DL (ref 74–99)
HCT VFR BLD AUTO: 31.2 % (ref 35–45)
HGB BLD-MCNC: 10.8 G/DL (ref 12–16)
LYMPHOCYTES # BLD: 1.7 K/UL (ref 0.9–3.6)
LYMPHOCYTES NFR BLD: 15 % (ref 21–52)
MCH RBC QN AUTO: 30.8 PG (ref 24–34)
MCHC RBC AUTO-ENTMCNC: 34.6 G/DL (ref 31–37)
MCV RBC AUTO: 88.9 FL (ref 74–97)
MONOCYTES # BLD: 1.1 K/UL (ref 0.05–1.2)
MONOCYTES NFR BLD: 9 % (ref 3–10)
NEUTS SEG # BLD: 8.5 K/UL (ref 1.8–8)
NEUTS SEG NFR BLD: 75 % (ref 40–73)
PLATELET # BLD AUTO: 247 K/UL (ref 135–420)
PMV BLD AUTO: 9.1 FL (ref 9.2–11.8)
POTASSIUM SERPL-SCNC: 3 MMOL/L (ref 3.5–5.5)
PROT SERPL-MCNC: 6.2 G/DL (ref 6.4–8.2)
RBC # BLD AUTO: 3.51 M/UL (ref 4.2–5.3)
SODIUM SERPL-SCNC: 123 MMOL/L (ref 136–145)
WBC # BLD AUTO: 11.3 K/UL (ref 4.6–13.2)

## 2021-02-17 PROCEDURE — 85025 COMPLETE CBC W/AUTO DIFF WBC: CPT

## 2021-02-17 PROCEDURE — 36415 COLL VENOUS BLD VENIPUNCTURE: CPT

## 2021-02-17 PROCEDURE — 80053 COMPREHEN METABOLIC PANEL: CPT

## 2021-02-26 ENCOUNTER — HOSPITAL ENCOUNTER (OUTPATIENT)
Dept: PREADMISSION TESTING | Age: 81
Discharge: HOME | End: 2021-02-26
Payer: MEDICARE

## 2021-02-26 PROCEDURE — U0003 INFECTIOUS AGENT DETECTION BY NUCLEIC ACID (DNA OR RNA); SEVERE ACUTE RESPIRATORY SYNDROME CORONAVIRUS 2 (SARS-COV-2) (CORONAVIRUS DISEASE [COVID-19]), AMPLIFIED PROBE TECHNIQUE, MAKING USE OF HIGH THROUGHPUT TECHNOLOGIES AS DESCRIBED BY CMS-2020-01-R: HCPCS

## 2021-02-27 LAB — SARS-COV-2, COV2NT: NOT DETECTED

## 2021-03-26 ENCOUNTER — HOSPITAL ENCOUNTER (OUTPATIENT)
Dept: PREADMISSION TESTING | Age: 81
Discharge: HOME | End: 2021-03-26
Payer: MEDICARE

## 2021-03-26 PROCEDURE — U0003 INFECTIOUS AGENT DETECTION BY NUCLEIC ACID (DNA OR RNA); SEVERE ACUTE RESPIRATORY SYNDROME CORONAVIRUS 2 (SARS-COV-2) (CORONAVIRUS DISEASE [COVID-19]), AMPLIFIED PROBE TECHNIQUE, MAKING USE OF HIGH THROUGHPUT TECHNOLOGIES AS DESCRIBED BY CMS-2020-01-R: HCPCS

## 2021-03-27 LAB — SARS-COV-2, COV2NT: NOT DETECTED

## 2021-03-31 ENCOUNTER — ANESTHESIA EVENT (OUTPATIENT)
Dept: SURGERY | Age: 81
End: 2021-03-31
Payer: MEDICARE

## 2021-03-31 ENCOUNTER — HOSPITAL ENCOUNTER (OUTPATIENT)
Dept: NUCLEAR MEDICINE | Age: 81
Discharge: HOME | End: 2021-03-31
Attending: SURGERY
Payer: MEDICARE

## 2021-03-31 DIAGNOSIS — C50.112 MALIGNANT NEOPLASM OF CENTRAL PORTION OF LEFT FEMALE BREAST (HCC): ICD-10-CM

## 2021-03-31 PROCEDURE — A9541 TC99M SULFUR COLLOID: HCPCS

## 2021-04-01 ENCOUNTER — ANESTHESIA (OUTPATIENT)
Dept: SURGERY | Age: 81
End: 2021-04-01
Payer: MEDICARE

## 2021-04-01 ENCOUNTER — HOSPITAL ENCOUNTER (OUTPATIENT)
Age: 81
Setting detail: OBSERVATION
Discharge: HOME HEALTH | End: 2021-04-01
Attending: SURGERY | Admitting: SURGERY
Payer: MEDICARE

## 2021-04-01 VITALS
WEIGHT: 123.5 LBS | BODY MASS INDEX: 21.08 KG/M2 | RESPIRATION RATE: 16 BRPM | DIASTOLIC BLOOD PRESSURE: 58 MMHG | SYSTOLIC BLOOD PRESSURE: 159 MMHG | OXYGEN SATURATION: 100 % | HEART RATE: 92 BPM | HEIGHT: 64 IN | TEMPERATURE: 98 F

## 2021-04-01 DIAGNOSIS — Z90.12 S/P LEFT MASTECTOMY: Primary | ICD-10-CM

## 2021-04-01 PROBLEM — C50.912 BREAST CANCER, LEFT (HCC): Status: ACTIVE | Noted: 2021-04-01

## 2021-04-01 PROCEDURE — 77030031139 HC SUT VCRL2 J&J -A: Performed by: SURGERY

## 2021-04-01 PROCEDURE — 77030040504 HC DRN WND MDII -B: Performed by: SURGERY

## 2021-04-01 PROCEDURE — 77030011265 HC ELECTRD BLD HEX COVD -A: Performed by: SURGERY

## 2021-04-01 PROCEDURE — 77030010512 HC APPL CLP LIG J&J -C: Performed by: SURGERY

## 2021-04-01 PROCEDURE — 77030040506 HC DRN WND MDII -A: Performed by: SURGERY

## 2021-04-01 PROCEDURE — 74011250636 HC RX REV CODE- 250/636: Performed by: REGISTERED NURSE

## 2021-04-01 PROCEDURE — 77030010507 HC ADH SKN DERMBND J&J -B: Performed by: SURGERY

## 2021-04-01 PROCEDURE — 77030002933 HC SUT MCRYL J&J -A: Performed by: SURGERY

## 2021-04-01 PROCEDURE — 74011000636 HC RX REV CODE- 636: Performed by: SURGERY

## 2021-04-01 PROCEDURE — 74011000250 HC RX REV CODE- 250: Performed by: REGISTERED NURSE

## 2021-04-01 PROCEDURE — 77030011825 HC SUPP SURG PSTOP S2SG -B: Performed by: SURGERY

## 2021-04-01 PROCEDURE — 74011000250 HC RX REV CODE- 250: Performed by: ANESTHESIOLOGY

## 2021-04-01 PROCEDURE — 88342 IMHCHEM/IMCYTCHM 1ST ANTB: CPT

## 2021-04-01 PROCEDURE — 77030002916 HC SUT ETHLN J&J -A: Performed by: SURGERY

## 2021-04-01 PROCEDURE — 77030002996 HC SUT SLK J&J -A: Performed by: SURGERY

## 2021-04-01 PROCEDURE — 77030020782 HC GWN BAIR PAWS FLX 3M -B: Performed by: SURGERY

## 2021-04-01 PROCEDURE — 88360 TUMOR IMMUNOHISTOCHEM/MANUAL: CPT

## 2021-04-01 PROCEDURE — 99218 HC RM OBSERVATION: CPT

## 2021-04-01 PROCEDURE — 76060000036 HC ANESTHESIA 2.5 TO 3 HR: Performed by: SURGERY

## 2021-04-01 PROCEDURE — 88309 TISSUE EXAM BY PATHOLOGIST: CPT

## 2021-04-01 PROCEDURE — 77030012508 HC MSK AIRWY LMA AMBU -A: Performed by: ANESTHESIOLOGY

## 2021-04-01 PROCEDURE — 74011000250 HC RX REV CODE- 250: Performed by: SURGERY

## 2021-04-01 PROCEDURE — 76010000132 HC OR TIME 2.5 TO 3 HR: Performed by: SURGERY

## 2021-04-01 PROCEDURE — 77030013079 HC BLNKT BAIR HGGR 3M -A: Performed by: ANESTHESIOLOGY

## 2021-04-01 PROCEDURE — 76210000016 HC OR PH I REC 1 TO 1.5 HR: Performed by: SURGERY

## 2021-04-01 PROCEDURE — 74011250636 HC RX REV CODE- 250/636: Performed by: SURGERY

## 2021-04-01 PROCEDURE — 77010033678 HC OXYGEN DAILY

## 2021-04-01 PROCEDURE — 74011250637 HC RX REV CODE- 250/637: Performed by: SURGERY

## 2021-04-01 PROCEDURE — 77030031753 HC SHR ENDO COAG HARM J&J -E: Performed by: SURGERY

## 2021-04-01 PROCEDURE — 2709999900 HC NON-CHARGEABLE SUPPLY: Performed by: SURGERY

## 2021-04-01 PROCEDURE — 74011250637 HC RX REV CODE- 250/637: Performed by: ANESTHESIOLOGY

## 2021-04-01 PROCEDURE — 77030020407 HC IV BLD WRMR ST 3M -A: Performed by: ANESTHESIOLOGY

## 2021-04-01 RX ORDER — CEFAZOLIN SODIUM/WATER 2 G/20 ML
2 SYRINGE (ML) INTRAVENOUS ONCE
Status: COMPLETED | OUTPATIENT
Start: 2021-04-01 | End: 2021-04-01

## 2021-04-01 RX ORDER — HYDROCODONE BITARTRATE AND ACETAMINOPHEN 5; 325 MG/1; MG/1
1 TABLET ORAL
Status: DISCONTINUED | OUTPATIENT
Start: 2021-04-01 | End: 2021-04-01 | Stop reason: HOSPADM

## 2021-04-01 RX ORDER — MIRTAZAPINE 15 MG/1
30 TABLET, FILM COATED ORAL
Status: DISCONTINUED | OUTPATIENT
Start: 2021-04-01 | End: 2021-04-01 | Stop reason: HOSPADM

## 2021-04-01 RX ORDER — SODIUM CHLORIDE 0.9 % (FLUSH) 0.9 %
5-40 SYRINGE (ML) INJECTION AS NEEDED
Status: DISCONTINUED | OUTPATIENT
Start: 2021-04-01 | End: 2021-04-01

## 2021-04-01 RX ORDER — METOPROLOL SUCCINATE 50 MG/1
100 TABLET, EXTENDED RELEASE ORAL
Status: COMPLETED | OUTPATIENT
Start: 2021-04-01 | End: 2021-04-01

## 2021-04-01 RX ORDER — GLYCOPYRROLATE 0.2 MG/ML
INJECTION INTRAMUSCULAR; INTRAVENOUS AS NEEDED
Status: DISCONTINUED | OUTPATIENT
Start: 2021-04-01 | End: 2021-04-01 | Stop reason: HOSPADM

## 2021-04-01 RX ORDER — ISOSULFAN BLUE 50 MG/5ML
INJECTION, SOLUTION SUBCUTANEOUS AS NEEDED
Status: DISCONTINUED | OUTPATIENT
Start: 2021-04-01 | End: 2021-04-01 | Stop reason: HOSPADM

## 2021-04-01 RX ORDER — EPHEDRINE SULFATE/0.9% NACL/PF 50 MG/5 ML
SYRINGE (ML) INTRAVENOUS AS NEEDED
Status: DISCONTINUED | OUTPATIENT
Start: 2021-04-01 | End: 2021-04-01 | Stop reason: HOSPADM

## 2021-04-01 RX ORDER — AMLODIPINE BESYLATE 5 MG/1
5 TABLET ORAL DAILY
Status: DISCONTINUED | OUTPATIENT
Start: 2021-04-01 | End: 2021-04-01 | Stop reason: HOSPADM

## 2021-04-01 RX ORDER — DOCUSATE SODIUM 100 MG/1
100 CAPSULE, LIQUID FILLED ORAL
Status: DISCONTINUED | OUTPATIENT
Start: 2021-04-01 | End: 2021-04-01 | Stop reason: HOSPADM

## 2021-04-01 RX ORDER — LISINOPRIL 5 MG/1
10 TABLET ORAL DAILY
Status: DISCONTINUED | OUTPATIENT
Start: 2021-04-01 | End: 2021-04-01 | Stop reason: HOSPADM

## 2021-04-01 RX ORDER — PROPOFOL 10 MG/ML
INJECTION, EMULSION INTRAVENOUS AS NEEDED
Status: DISCONTINUED | OUTPATIENT
Start: 2021-04-01 | End: 2021-04-01 | Stop reason: HOSPADM

## 2021-04-01 RX ORDER — AMLODIPINE BESYLATE 5 MG/1
5 TABLET ORAL DAILY
Status: DISCONTINUED | OUTPATIENT
Start: 2021-04-02 | End: 2021-04-01

## 2021-04-01 RX ORDER — KETAMINE HCL IN 0.9 % NACL 50 MG/5 ML
SYRINGE (ML) INTRAVENOUS AS NEEDED
Status: DISCONTINUED | OUTPATIENT
Start: 2021-04-01 | End: 2021-04-01 | Stop reason: HOSPADM

## 2021-04-01 RX ORDER — DEXAMETHASONE SODIUM PHOSPHATE 4 MG/ML
INJECTION, SOLUTION INTRA-ARTICULAR; INTRALESIONAL; INTRAMUSCULAR; INTRAVENOUS; SOFT TISSUE AS NEEDED
Status: DISCONTINUED | OUTPATIENT
Start: 2021-04-01 | End: 2021-04-01 | Stop reason: HOSPADM

## 2021-04-01 RX ORDER — HYDROMORPHONE HYDROCHLORIDE 2 MG/ML
INJECTION, SOLUTION INTRAMUSCULAR; INTRAVENOUS; SUBCUTANEOUS AS NEEDED
Status: DISCONTINUED | OUTPATIENT
Start: 2021-04-01 | End: 2021-04-01 | Stop reason: HOSPADM

## 2021-04-01 RX ORDER — LABETALOL HYDROCHLORIDE 5 MG/ML
5 INJECTION, SOLUTION INTRAVENOUS
Status: COMPLETED | OUTPATIENT
Start: 2021-04-01 | End: 2021-04-01

## 2021-04-01 RX ORDER — HYDROMORPHONE HYDROCHLORIDE 1 MG/ML
0.5 INJECTION, SOLUTION INTRAMUSCULAR; INTRAVENOUS; SUBCUTANEOUS
Status: DISCONTINUED | OUTPATIENT
Start: 2021-04-01 | End: 2021-04-01

## 2021-04-01 RX ORDER — HYDROCODONE BITARTRATE AND ACETAMINOPHEN 5; 325 MG/1; MG/1
1 TABLET ORAL
Qty: 18 TAB | Refills: 0 | Status: SHIPPED | OUTPATIENT
Start: 2021-04-01 | End: 2021-04-04

## 2021-04-01 RX ORDER — ONDANSETRON 2 MG/ML
8 INJECTION INTRAMUSCULAR; INTRAVENOUS
Status: DISCONTINUED | OUTPATIENT
Start: 2021-04-01 | End: 2021-04-01 | Stop reason: HOSPADM

## 2021-04-01 RX ORDER — ONDANSETRON 8 MG/1
8 TABLET, ORALLY DISINTEGRATING ORAL
Qty: 12 TAB | Refills: 0 | Status: SHIPPED | OUTPATIENT
Start: 2021-04-01

## 2021-04-01 RX ORDER — METOCLOPRAMIDE HYDROCHLORIDE 5 MG/ML
INJECTION INTRAMUSCULAR; INTRAVENOUS AS NEEDED
Status: DISCONTINUED | OUTPATIENT
Start: 2021-04-01 | End: 2021-04-01 | Stop reason: HOSPADM

## 2021-04-01 RX ORDER — LIDOCAINE HYDROCHLORIDE 20 MG/ML
INJECTION, SOLUTION EPIDURAL; INFILTRATION; INTRACAUDAL; PERINEURAL AS NEEDED
Status: DISCONTINUED | OUTPATIENT
Start: 2021-04-01 | End: 2021-04-01 | Stop reason: HOSPADM

## 2021-04-01 RX ORDER — SODIUM CHLORIDE, SODIUM LACTATE, POTASSIUM CHLORIDE, CALCIUM CHLORIDE 600; 310; 30; 20 MG/100ML; MG/100ML; MG/100ML; MG/100ML
50 INJECTION, SOLUTION INTRAVENOUS CONTINUOUS
Status: DISCONTINUED | OUTPATIENT
Start: 2021-04-01 | End: 2021-04-01 | Stop reason: HOSPADM

## 2021-04-01 RX ORDER — MIDAZOLAM HYDROCHLORIDE 1 MG/ML
INJECTION, SOLUTION INTRAMUSCULAR; INTRAVENOUS AS NEEDED
Status: DISCONTINUED | OUTPATIENT
Start: 2021-04-01 | End: 2021-04-01 | Stop reason: HOSPADM

## 2021-04-01 RX ORDER — DEXMEDETOMIDINE HYDROCHLORIDE 100 UG/ML
INJECTION, SOLUTION INTRAVENOUS AS NEEDED
Status: DISCONTINUED | OUTPATIENT
Start: 2021-04-01 | End: 2021-04-01 | Stop reason: HOSPADM

## 2021-04-01 RX ORDER — SODIUM CHLORIDE, SODIUM LACTATE, POTASSIUM CHLORIDE, CALCIUM CHLORIDE 600; 310; 30; 20 MG/100ML; MG/100ML; MG/100ML; MG/100ML
125 INJECTION, SOLUTION INTRAVENOUS CONTINUOUS
Status: DISCONTINUED | OUTPATIENT
Start: 2021-04-01 | End: 2021-04-01 | Stop reason: HOSPADM

## 2021-04-01 RX ORDER — FENTANYL CITRATE 50 UG/ML
25 INJECTION, SOLUTION INTRAMUSCULAR; INTRAVENOUS AS NEEDED
Status: DISCONTINUED | OUTPATIENT
Start: 2021-04-01 | End: 2021-04-01

## 2021-04-01 RX ORDER — LISINOPRIL 5 MG/1
10 TABLET ORAL DAILY
Status: DISCONTINUED | OUTPATIENT
Start: 2021-04-02 | End: 2021-04-01

## 2021-04-01 RX ORDER — SODIUM CHLORIDE, SODIUM LACTATE, POTASSIUM CHLORIDE, CALCIUM CHLORIDE 600; 310; 30; 20 MG/100ML; MG/100ML; MG/100ML; MG/100ML
125 INJECTION, SOLUTION INTRAVENOUS CONTINUOUS
Status: DISCONTINUED | OUTPATIENT
Start: 2021-04-01 | End: 2021-04-01

## 2021-04-01 RX ORDER — SODIUM CHLORIDE 0.9 % (FLUSH) 0.9 %
5-40 SYRINGE (ML) INJECTION EVERY 8 HOURS
Status: DISCONTINUED | OUTPATIENT
Start: 2021-04-01 | End: 2021-04-01

## 2021-04-01 RX ORDER — MORPHINE SULFATE 4 MG/ML
1 INJECTION INTRAVENOUS
Status: DISCONTINUED | OUTPATIENT
Start: 2021-04-01 | End: 2021-04-01 | Stop reason: HOSPADM

## 2021-04-01 RX ORDER — ATORVASTATIN CALCIUM 20 MG/1
40 TABLET, FILM COATED ORAL
Status: DISCONTINUED | OUTPATIENT
Start: 2021-04-01 | End: 2021-04-01 | Stop reason: HOSPADM

## 2021-04-01 RX ORDER — ONDANSETRON 2 MG/ML
INJECTION INTRAMUSCULAR; INTRAVENOUS AS NEEDED
Status: DISCONTINUED | OUTPATIENT
Start: 2021-04-01 | End: 2021-04-01 | Stop reason: HOSPADM

## 2021-04-01 RX ORDER — METOPROLOL SUCCINATE 50 MG/1
100 TABLET, EXTENDED RELEASE ORAL 2 TIMES DAILY
Status: DISCONTINUED | OUTPATIENT
Start: 2021-04-01 | End: 2021-04-01 | Stop reason: HOSPADM

## 2021-04-01 RX ADMIN — LABETALOL HYDROCHLORIDE 5 MG: 5 INJECTION INTRAVENOUS at 11:06

## 2021-04-01 RX ADMIN — METOPROLOL SUCCINATE 100 MG: 50 TABLET, EXTENDED RELEASE ORAL at 07:04

## 2021-04-01 RX ADMIN — HYDROMORPHONE HYDROCHLORIDE 0.5 MG: 2 INJECTION, SOLUTION INTRAMUSCULAR; INTRAVENOUS; SUBCUTANEOUS at 08:42

## 2021-04-01 RX ADMIN — LISINOPRIL 10 MG: 5 TABLET ORAL at 14:18

## 2021-04-01 RX ADMIN — AMLODIPINE BESYLATE 5 MG: 5 TABLET ORAL at 14:18

## 2021-04-01 RX ADMIN — SODIUM CHLORIDE, SODIUM LACTATE, POTASSIUM CHLORIDE, AND CALCIUM CHLORIDE 125 ML/HR: 600; 310; 30; 20 INJECTION, SOLUTION INTRAVENOUS at 06:34

## 2021-04-01 RX ADMIN — DEXMEDETOMIDINE HYDROCHLORIDE 10 MCG: 100 INJECTION, SOLUTION INTRAVENOUS at 08:55

## 2021-04-01 RX ADMIN — METOCLOPRAMIDE 5 MG: 5 INJECTION, SOLUTION INTRAMUSCULAR; INTRAVENOUS at 08:02

## 2021-04-01 RX ADMIN — HYDROMORPHONE HYDROCHLORIDE 0.5 MG: 2 INJECTION, SOLUTION INTRAMUSCULAR; INTRAVENOUS; SUBCUTANEOUS at 08:33

## 2021-04-01 RX ADMIN — ONDANSETRON HYDROCHLORIDE 4 MG: 2 INJECTION INTRAMUSCULAR; INTRAVENOUS at 08:05

## 2021-04-01 RX ADMIN — DEXAMETHASONE SODIUM PHOSPHATE 4 MG: 4 INJECTION, SOLUTION INTRAMUSCULAR; INTRAVENOUS at 08:01

## 2021-04-01 RX ADMIN — Medication 20 MG: at 08:25

## 2021-04-01 RX ADMIN — LABETALOL HYDROCHLORIDE 5 MG: 5 INJECTION INTRAVENOUS at 10:49

## 2021-04-01 RX ADMIN — Medication 10 MG: at 09:40

## 2021-04-01 RX ADMIN — Medication 30 MG: at 07:55

## 2021-04-01 RX ADMIN — MIDAZOLAM 2 MG: 1 INJECTION INTRAMUSCULAR; INTRAVENOUS at 07:49

## 2021-04-01 RX ADMIN — CEFAZOLIN 2 G: 1 INJECTION, POWDER, FOR SOLUTION INTRAVENOUS at 07:59

## 2021-04-01 RX ADMIN — SODIUM CHLORIDE, SODIUM LACTATE, POTASSIUM CHLORIDE, AND CALCIUM CHLORIDE: 600; 310; 30; 20 INJECTION, SOLUTION INTRAVENOUS at 09:20

## 2021-04-01 RX ADMIN — PROPOFOL 200 MG: 10 INJECTION, EMULSION INTRAVENOUS at 07:54

## 2021-04-01 RX ADMIN — LIDOCAINE HYDROCHLORIDE 100 MG: 20 INJECTION, SOLUTION EPIDURAL; INFILTRATION; INTRACAUDAL; PERINEURAL at 07:54

## 2021-04-01 RX ADMIN — GLYCOPYRROLATE 0.2 MG: 0.2 INJECTION INTRAMUSCULAR; INTRAVENOUS at 08:00

## 2021-04-01 RX ADMIN — SODIUM CHLORIDE, SODIUM LACTATE, POTASSIUM CHLORIDE, AND CALCIUM CHLORIDE 50 ML/HR: 600; 310; 30; 20 INJECTION, SOLUTION INTRAVENOUS at 13:00

## 2021-04-01 NOTE — PROGRESS NOTES
1700 - Bedside shift report received from LUIS FELIPE Rosales RN. Assumed care of patient. Patient noted eating at this time. Call light in reach. [id number] - Discharge orders noted. Page out to home health for consult. Awaiting return call. [facility] with Adonis Khanna CM and notified of patient D/C order and consult to home health. Stated she will contact the patient to make arrangements.

## 2021-04-01 NOTE — PROGRESS NOTES
27[address] [facility] care of pt at this time. Assessment complete. Pt alert and oriented x 4. Shows no sign of distress. Fall risk arm band in place. Denies SOB and chest pain. Pt lungs clear bilaterally. Cap refill  less than 3 seconds. Pt denies numbness and tingling to all extremities. Stated pain 4/10. Pt has 20 G IV to R hand. Pt has supportive bra and gauze dressing CDI . SCDS in place. Incentive spirometer at bedside. Pt encouraged to continue use of IS. Pt verbalized understanding. Ice pack applied. Call light and possessions within reach. Bed locked and in low position. Will continue to monitor. 1430  Nurse educated pt and  on how to empty and measure j/p output. 1600  Pt sitting up in bed eating food spouse at bedside.

## 2021-04-01 NOTE — BRIEF OP NOTE
Brief Postoperative Note    Patient: Ricardo Willams  YOB: 1940  MRN: [id number]    Date of Procedure: 4/1/2021     Pre-Op Diagnosis: LEFT BREAST CANCER (LOBULAR), ER NEGATIVE    Post-Op Diagnosis: Same as preoperative diagnosis. Procedure(s):  LEFT MASTECTOMY WITH AXILLARY LYMPH NODE DISSECTION (MODIFIED RADICAL MASTECTOMY), INTRADERMAL LYMPHAZURIN BLUE DYE INJECTION FOR LYMPH NODE LOCALIZATION    Surgeon(s):  Timothy Butler MD    Surgical Assistant: Surg Asst-1: Carlos Fly    Anesthesia: General     Estimated Blood Loss (mL): less than 50     Complications: None    Specimens:   ID Type Source Tests Collected by Time Destination   1 : left breast and axillary contents Preservative Breast  Post, Leonila Alexander MD 4/1/2021 7800 Pathology        Implants: * No implants in log *    Drains:   Maggie Franco #1 04/01/21 Left Breast (Active)   Site Assessment Clean, dry, & intact 04/01/21 0927   Dressing Status Clean, dry, & intact 04/01/21 0927   Drainage Description Serosanguinous 04/01/21 0927   Jamal Drain Airleak No 04/01/21 0927   Status Patent; Charged 04/01/21 0927   Y Connector Used No 04/01/21 2575       Findings: N/A    Electronically Signed by Ed Tran MD on 4/1/2021 at 10:45 AM    Op note dictated #[id number]  RP

## 2021-04-01 NOTE — ANESTHESIA POSTPROCEDURE EVALUATION
Post-Anesthesia Evaluation and Assessment    Cardiovascular Function/Vital Signs  Visit Vitals  BP (!) 166/56   Pulse 87   Temp 36.1 °C (97 °F)   Resp 22   Ht 5' 4\" (1.626 m)   Wt 56 kg (123 lb 8 oz)   SpO2 97%   BMI 21.20 kg/m²       Patient is status post Procedure(s):  LEFT MASTECTOMY, AXILLARY LYMPH NODE DISSECTION   LYMPHAZURIN BLUE DYE. Nausea/Vomiting: Controlled. Postoperative hydration reviewed and adequate. Pain:  Pain Scale 1: Numeric (0 - 10) (04/01/21 1111)  Pain Intensity 1: 0 (04/01/21 1111)   Managed. Neurological Status:   Neuro (WDL): Exceptions to WDL (04/01/21 1111)   At baseline. Mental Status and Level of Consciousness: Baseline and stable. Pulmonary Status:   O2 Device: Nasal cannula (04/01/21 1111)   Adequate oxygenation and airway patent. Complications related to anesthesia: None    Post-anesthesia assessment completed. No concerns. Patient has met all discharge requirements.     Signed By: Juan Zhong MD

## 2021-04-01 NOTE — ANESTHESIA PREPROCEDURE EVALUATION
Relevant Problems   No relevant active problems       Anesthetic History   No history of anesthetic complications            Review of Systems / Medical History  Patient summary reviewed, nursing notes reviewed and pertinent labs reviewed    Pulmonary  Within defined limits                 Neuro/Psych       CVA  TIA     Cardiovascular    Hypertension          CAD    Exercise tolerance: >4 METS     GI/Hepatic/Renal           PUD     Endo/Other  Within defined limits           Other Findings              Physical Exam    Airway  Mallampati: III  TM Distance: 4 - 6 cm  Neck ROM: decreased range of motion   Mouth opening: Diminished (comment)     Cardiovascular  Regular rate and rhythm,  S1 and S2 normal,  no murmur, click, rub, or gallop  Rhythm: regular  Rate: normal         Dental    Dentition: Full upper dentures     Pulmonary  Breath sounds clear to auscultation               Abdominal  GI exam deferred       Other Findings            Anesthetic Plan    ASA: 3  Anesthesia type: general          Induction: Intravenous  Anesthetic plan and risks discussed with: Patient

## 2021-04-01 NOTE — PERIOP NOTES
Dr Paris Vidales aware of patient elevated BP, patient to resume BP medications once transferred to 2 S.

## 2021-04-01 NOTE — ROUTINE PROCESS
TRANSFER - IN REPORT: 
 
Verbal report received from LUIS Welch RN(name) on Darylene Settle  being received from PACU(unit) for routine post - op Report consisted of patients Situation, Background, Assessment and  
Recommendations(SBAR). Information from the following report(s) SBAR, Kardex, [facility] and Recent Results was reviewed with the receiving nurse. Opportunity for questions and clarification was provided. Assessment completed upon patients arrival to unit and care assumed.

## 2021-04-01 NOTE — INTERVAL H&P NOTE
Update History & Physical 
 
The Patient's History and Physical of April 1, 2021 was reviewed with the patient and I examined the patient. There was no change. The surgical site was confirmed by the patient and me. Plan:  The risk, benefits, expected outcome, and alternative to the recommended procedure have been discussed with the patient. Patient understands and wants to proceed with the procedure.  
 
Electronically signed by Dione Ellis MD on 4/1/2021 at 7:37 AM

## 2021-04-01 NOTE — DISCHARGE INSTRUCTIONS
Patient Education        Mastectomy: What to Expect at Home  Your Recovery     Right after the surgery, you will probably feel weak, and you may feel sore for 2 to 3 days. You may feel pulling or stretching near or under your arm. You may also have itching, tingling, and throbbing in the area. This will get better in a few days. You will likely have several drains near your incision. These help with healing. The drains will be removed when the fluid buildup slows. Drains are usually removed in the first few weeks after surgery. You may be able to go back to your normal routine or return to work in several weeks, but it may take longer. How long it takes you to recover will depend on the type of surgery you had. It also depends on whether you had breast reconstruction at the same time, or if you need other treatment. Your doctor or nurse will be able to give you an idea of what you can expect. When you find out that you have cancer, you may feel many emotions and may need some help coping. This is common. Seek out family, friends, and counselors for support. You also can do things at home to make yourself feel better while you go through treatment. Call the [facility] (2-217.346.5951) or visit its website at [address] to learn more. This care sheet gives you a general idea about how long it will take for you to recover. But each person recovers at a different pace. Follow the steps below to get better as quickly as possible. How can you care for yourself at home? Activity    · Rest when you feel tired. Getting enough sleep will help you recover. After any activity, rest and raise your affected arm for a period of time equal to your activity time.     · Try to walk each day. Start by walking a little more than you did the day before. Bit by bit, increase the amount you walk.  Walking boosts blood flow and helps prevent pneumonia and constipation.     · Avoid strenuous activities, such as biking, jogging, weightlifting, or aerobic exercise, until your doctor says it is okay. This includes housework, especially if you have to use your affected arm. You will probably be able to do your normal activities in 3 to 6 weeks. Avoid repeated motions with your affected arm, such as weed pulling, window cleaning, or vacuuming, for 6 months.     · Avoid lifting anything over 10 to 15 pounds for 4 to 6 weeks. This may include a child, grocery bags, a heavy briefcase or backpack, cat litter or dog food bags, or a vacuum .     · Ask your doctor when you can drive again.     · You will probably be able to go back to work or your normal routine in 3 to 6 weeks. This depends on the type of work you do and any further treatment.     · Your doctor will let you know how soon you can take showers or baths. Diet    · You can eat your normal diet. If your stomach is upset, try bland, low-fat foods like plain rice, broiled chicken, toast, and yogurt.     · Drink plenty of fluids (unless your doctor tells you not to).     · You may notice that your bowels are not regular right after your surgery. This is common. Try to avoid constipation and straining with bowel movements. Take a fiber supplement such as Citrucel or Metamucil every day. If you have not had a bowel movement after a couple of days, take a mild laxative like Milk of Magnesia or a stool softener like Colace. Medicines    · Your doctor will tell you if and when you can restart your medicines. He or she will also give you instructions about taking any new medicines.     · If you take aspirin or some other blood thinner, ask your doctor if and when to start taking it again. Make sure that you understand exactly what your doctor wants you to do.     · Take pain medicines exactly as directed. ? If the doctor gave you a prescription medicine for pain, take it as prescribed.   ? If you are not taking a prescription pain medicine, ask your doctor if you can take an over-the-counter medicine.     · If your doctor prescribed antibiotics, take them as directed. Do not stop taking them just because you feel better. You need to take the full course of antibiotics.     · If you think your pain medicine is making you sick to your stomach:  ? Take your medicine after meals (unless your doctor has told you not to). ? Ask your doctor for a different pain medicine. Incision care    · You will have a dressing over the cut (incision). A dressing helps the incision heal and protects it. Your doctor will tell you how to take care of this.     · A woman may wear a special bra (surgi-bra) that holds the dressing in place after the surgery. The doctor will say when the bra is no longer needed. Drain care    · You may have one or more drains near your incision. Your doctor will tell you how to take care of them. Arm exercises    · If you had any lymph nodes removed from under your arm, your doctor will advise you to do arm exercises. Do not do the exercises until your doctor says it is okay. Ice and elevation    · Do not use ice for swelling or pain.     · Prop up your arm on a pillow when you sit or lie down. Try to keep your arm above the level of your heart. This will help reduce swelling. Follow-up care is a key part of your treatment and safety. Be sure to make and go to all appointments, and call your doctor if you are having problems. It's also a good idea to know your test results and keep a list of the medicines you take. When should you call for help? Call 911 anytime you think you may need emergency care. For example, call if:    · You passed out (lost consciousness).     · You have chest pain, are short of breath, or cough up blood.    Call your doctor now or seek immediate medical care if:    · You are sick to your stomach or cannot drink fluids.     · You cannot pass stools or gas.     · You have pain that does not get better after you take your pain medicine.     · You have loose stitches, or your incision comes open.     · Bright red blood has soaked through the bandage over your incision.     · You have signs of a blood clot in your leg (called a deep vein thrombosis), such as:  ? Pain in your calf, back of the knee, thigh, or groin. ? Redness or swelling in your leg.     · You have signs of infection, such as:  ? Increased pain, swelling, warmth, or redness. ? Red streaks leading from the incision. ? Pus draining from the incision. ? A fever. Watch closely for changes in your health, and be sure to contact your doctor if:    · You have any problems.     · You have new or worse swelling or pain in your arm. Where can you learn more? Go to http://www.gray.com/  Enter S340 in the search box to learn more about \"Mastectomy: What to Expect at Home. \"  Current as of: December 17, 2020               Content Version: 12.8  © 2006-2021 Healthwise, [facility]. Care instructions adapted under license by [employer] (which disclaims liability or warranty for this information). If you have questions about a medical condition or this instruction, always ask your healthcare professional. [address] any warranty or liability for your use of this information.

## 2021-04-01 NOTE — PERIOP NOTES
TRANSFER - OUT REPORT:    Verbal report given to Renzo (name) on Marcela Bentley  being transferred to 2 S (unit) for routine progression of care       Report consisted of patients Situation, Background, Assessment and   Recommendations(SBAR). Information from the following report(s) OR Summary, Procedure Summary, Intake/Output and MAR was reviewed with the receiving nurse. Lines:   Peripheral IV 04/01/21 Right Hand (Active)   Site Assessment Clean, dry, & intact 04/01/21 0803   Phlebitis Assessment 0 04/01/21 0803   Infiltration Assessment 0 04/01/21 0803   Dressing Status Clean, dry, & intact 04/01/21 0803   Dressing Type Transparent 04/01/21 0803   Hub Color/Line Status Patent; Infusing;Pink 04/01/21 0633        Intake/Output Summary (Last 24 hours) at 4/1/2021 1224  Last data filed at 4/1/2021 1223  Gross per 24 hour   Intake 1800 ml   Output 145 ml   Net 1655 ml       Opportunity for questions and clarification was provided.       Patient transported with:   O2 @ 2 liters  Registered Nurse

## 2021-04-01 NOTE — PERIOP NOTES
Pt was unable to provide info for the completion of the PTA list. Her spouse was called and he provided the info due to he is the one who administers his wife's meds. Reviewed PTA medication list with patient/caregiver and patient/caregiver denies any additional medications. Patient admits to having a responsible adult care for them at home for at least 24 hours after surgery. Patient encouraged to use gown warming system and informed that using said warming gown to regulate body temperature prior to a procedure has been shown to help reduce the risks of blood clots and infection. Patient's pharmacy of choice verified and documented in PTA medication section. Dual skin assessment & fall risk band verification completed with Daniella RN.

## 2021-04-01 NOTE — PROGRESS NOTES
DC Plan: home with Home Health for dressing changes and drain care    Care manager on call received page to assist with home health; called into patient room and offered [facility]; patient has selected [facility] and referral will be placed. As this is after hours, if [facility] cannot cover in timely manner or does not cover [address] location, care manager discussed with patient's  that the search would continue until proper coverage has occurred. Mr. Mark Monsalve will be called with this information on his cell [phone].    1941 - per [facility], they do not cover; will continue to search for coverage.

## 2021-04-01 NOTE — ROUTINE PROCESS
Bedside and Verbal shift change report given to LUIS medina (oncoming nurse) by Jayne Haines RN (offgoing nurse). Report included the following information SBAR, Kardex, MAR and Recent Results.

## 2021-04-01 NOTE — OP NOTES
[facility] [place]  OPERATIVE REPORT    Name:  Jordi Marlow  MR#:   [id number]  :  1940  ACCOUNT #:  [de-identified]  DATE OF SERVICE:  2021      PREOPERATIVE DIAGNOSIS:  Invasive lobular carcinoma, estrogen receptor negative. POSTOPERATIVE DIAGNOSIS:  Invasive lobular carcinoma, estrogen receptor negative. PROCEDURES PERFORMED:  1. Left mastectomy. 2.  Left axillary lymph node dissection (level I and level II dissection). 3.  Intradermal injection of Lymphazurin blue dye for lymph node localization. SURGEON:  Smita Maldonado MD    ASSISTANT:  None. ANESTHESIA:  General.    COMPLICATIONS:  None. SPECIMENS REMOVED:  Left breast and axillary contents for permanent pathology    DRAINS/IMPLANTS:  A 19-Djiboutian channeled Fortino-Zavala in left axilla/chest wall. ESTIMATED BLOOD LOSS:  Less than 50 mL. NARRATIVE OF OPERATION:  The patient is an 49-year-old with a history of previous left-sided breast cancer which was at that time ER and AZ positive, treated with breast conservation therapy. The patient states that she did not receive any chemotherapy, radiation treatments, or aromatase inhibitors following that, but I do not have any printed documentation of that. She also had a history of coronary artery disease, and for that reason, she was cleared preoperatively by Cardiology. The biopsy was done for a mammographic abnormality showing an ill-defined hypoechoic area of architectural distortion in the outer central breast, 3 o'clock position, centered at 3 cm from nipple. Exam showed a more diffuse involvement. 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 For the fact that it was a new cancer in the same breast, more diffusely involved, unknown whether or not she definitively had radiation or not, I did not feel that breast conservation surgery was advisable. I therefore recommended mastectomy.   I discussed with her and her  the nature of that surgery, alternatives, benefits, and risks. She gave consent to proceed. PROCEDURE:  She was taken to the OR on 04/01/2021, as my first case. She had had preoperative nuclear medicine lymph node scintigraphy injection late afternoon the day before surgery. On the day of surgery, she was met and identified in the preoperative holding area. The surgical site was marked. She was then brought to the operating room. Preoperative intravenous Ancef 2 g was given per protocol. She was positioned on the table. All pressure points appropriately padded. Sequential compression devices were applied. General anesthesia was administered with monitors and oxygen in place. Prior to sterile prep and drape, the area around the left nipple was alcohol swabbed, and intradermal four-quadrant injection of Lymphazurin blue dye was done to complete the lymph node localization. A total of 1 mL of Lymphazurin blue dye was injected. It should be noted that the dye did dissipate fairly quickly diffusely within the dermis. The area was then prepped and draped in the usual sterile fashion. I began the procedure first by scanning the axilla with the NeoProbe, there really was no axillary signal whatsoever even though the signal around the injection site did indicate a good titer of scintigraphy dye injected. For a variety of reasons, I did not feel that a sentinel lymph node biopsy was going to be possible. I marked off an ellipse on the left breast to incorporate the nipple-areolar complex, and a grossly normal margin of breast skin around the diffuse induration and thickening in the central and slightly lateral breast.  Skin incision was made with 15-blade through skin and dermis.   Skin flaps were then raised, superiorly went above the clavicle, medially to the medial aspect of the pectoralis muscle, and inferiorly went down to the inframammary fold and onto the upper abdominal wall, laterally went to the clavipectoral fascia and into the axillary fat pad. I repeated the NeoProbe signal at that time and again really saw no visible blue dye and no NeoProbe signal; therefore, sentinel lymph node biopsy was not possible. I did feel what felt like several large lymph nodes, and therefore, I did feel that an axillary lymph node dissection was the best approach. We then dissected the breast off the underlying pectoralis muscle from medial-to-lateral and [id number] including pectoralis fascia with the specimen extended in and removed the axillary fat pad off the chest wall medially, leaving the long thoracic nerve intact posteriorly and laterally to latissimus muscle and cephalad to the subclavian vein. Superficial vein exiting into the axillary fat pad from the axillary vein was clipped, marking the most cephalad aspect of the resection, divided in between clips with Metzenbaum scissors, and then, the remainder of the axillary fat pad was removed from [id number] sparing the thoracodorsal neurovascular bundle as well. Both the thoracodorsal nerve and long thoracic nerve had intact twitch function at the end of the procedure. Specimen was sent as left breast and axillary contents with several portions of the specimen being sent as piecemeal removal of the axillary fat pad, included within the same specimen bucket. Hemostasis was satisfactory throughout the entire procedure. Additional deep local anesthesia was injected to complete the field block. Wound was thoroughly irrigated. A separate stab incision was made in the left axilla, and through this, a 19-Icelandic channeled Fortino-Zavala drain was brought out, secured to the skin using a 3-0 nylon suture, and extended up into the axilla and then along the inferior chest wall.   Closure was done in layers with a combination of interrupted 2-0 and 3-0 Vicryl dermal sutures followed by running 4-0 Monocryl subcuticular suture to close the skin. Incision was then cleaned, dried, and dressed with Dermabond. Once the Dermabond was completely dry, a drain dressing was applied around the drain, and a fluff gauze pressure dressing was applied to  the mastectomy site with the surgical bra holding everything intact. The patient tolerated the entire procedure without incident. She was extubated in the OR and taken to recovery room postoperatively in stable condition.       Sona Adair MD      [id number]  D:  04/01/2021 10:44  T:  04/01/2021 19:53  JOB #:  [id number]  CC:  Martin Taylor MD

## 2021-04-01 NOTE — ROUTINE PROCESS
Dual AVS reviewed with Benita Boyce RN. All medications reviewed individually with patient. Opportunities for questions and concerns provided. Patient verbalized understanding and verified by teachback. IV discontinued, no redness, swelling or pain noted. Patient discharged via (mode of transport ie. Car, ambulance or air transport) car. Patient's arm band appropriately discarded.

## 2021-04-20 ENCOUNTER — HOSPITAL ENCOUNTER (OUTPATIENT)
Dept: PREADMISSION TESTING | Age: 81
Discharge: HOME | End: 2021-04-20
Payer: MEDICARE

## 2021-04-20 PROCEDURE — U0003 INFECTIOUS AGENT DETECTION BY NUCLEIC ACID (DNA OR RNA); SEVERE ACUTE RESPIRATORY SYNDROME CORONAVIRUS 2 (SARS-COV-2) (CORONAVIRUS DISEASE [COVID-19]), AMPLIFIED PROBE TECHNIQUE, MAKING USE OF HIGH THROUGHPUT TECHNOLOGIES AS DESCRIBED BY CMS-2020-01-R: HCPCS

## 2021-04-21 LAB — SARS-COV-2, COV2NT: NOT DETECTED

## 2021-04-22 ENCOUNTER — HOSPITAL ENCOUNTER (OUTPATIENT)
Age: 81
Setting detail: OUTPATIENT SURGERY
Discharge: HOME | End: 2021-04-22
Attending: SURGERY | Admitting: SURGERY
Payer: MEDICARE

## 2021-04-22 ENCOUNTER — ANESTHESIA (OUTPATIENT)
Dept: SURGERY | Age: 81
End: 2021-04-22
Payer: MEDICARE

## 2021-04-22 ENCOUNTER — ANESTHESIA EVENT (OUTPATIENT)
Dept: SURGERY | Age: 81
End: 2021-04-22
Payer: MEDICARE

## 2021-04-22 VITALS
HEART RATE: 82 BPM | BODY MASS INDEX: 21.18 KG/M2 | HEIGHT: 64 IN | TEMPERATURE: 97.4 F | SYSTOLIC BLOOD PRESSURE: 149 MMHG | DIASTOLIC BLOOD PRESSURE: 55 MMHG | OXYGEN SATURATION: 96 % | RESPIRATION RATE: 16 BRPM | WEIGHT: 124.06 LBS

## 2021-04-22 DIAGNOSIS — Z90.12 S/P LEFT MASTECTOMY: Primary | ICD-10-CM

## 2021-04-22 PROCEDURE — 76210000006 HC OR PH I REC 0.5 TO 1 HR: Performed by: SURGERY

## 2021-04-22 PROCEDURE — 77030010507 HC ADH SKN DERMBND J&J -B: Performed by: SURGERY

## 2021-04-22 PROCEDURE — 77030020782 HC GWN BAIR PAWS FLX 3M -B: Performed by: SURGERY

## 2021-04-22 PROCEDURE — 74011250636 HC RX REV CODE- 250/636: Performed by: SURGERY

## 2021-04-22 PROCEDURE — 74011000250 HC RX REV CODE- 250: Performed by: SURGERY

## 2021-04-22 PROCEDURE — 2709999900 HC NON-CHARGEABLE SUPPLY: Performed by: SURGERY

## 2021-04-22 PROCEDURE — 76010000138 HC OR TIME 0.5 TO 1 HR: Performed by: SURGERY

## 2021-04-22 PROCEDURE — 88304 TISSUE EXAM BY PATHOLOGIST: CPT

## 2021-04-22 PROCEDURE — 74011250636 HC RX REV CODE- 250/636: Performed by: ANESTHESIOLOGY

## 2021-04-22 PROCEDURE — 77030002933 HC SUT MCRYL J&J -A: Performed by: SURGERY

## 2021-04-22 PROCEDURE — 77030040361 HC SLV COMPR DVT MDII -B: Performed by: SURGERY

## 2021-04-22 PROCEDURE — 88305 TISSUE EXAM BY PATHOLOGIST: CPT

## 2021-04-22 PROCEDURE — 76060000032 HC ANESTHESIA 0.5 TO 1 HR: Performed by: SURGERY

## 2021-04-22 PROCEDURE — 74011000250 HC RX REV CODE- 250: Performed by: ANESTHESIOLOGY

## 2021-04-22 PROCEDURE — 77030031139 HC SUT VCRL2 J&J -A: Performed by: SURGERY

## 2021-04-22 PROCEDURE — 76210000021 HC REC RM PH II 0.5 TO 1 HR: Performed by: SURGERY

## 2021-04-22 RX ORDER — HYDROCODONE BITARTRATE AND ACETAMINOPHEN 5; 325 MG/1; MG/1
1 TABLET ORAL
Qty: 18 TAB | Refills: 0 | Status: SHIPPED | OUTPATIENT
Start: 2021-04-22 | End: 2021-04-25

## 2021-04-22 RX ORDER — FENTANYL CITRATE 50 UG/ML
INJECTION, SOLUTION INTRAMUSCULAR; INTRAVENOUS AS NEEDED
Status: DISCONTINUED | OUTPATIENT
Start: 2021-04-22 | End: 2021-04-22 | Stop reason: HOSPADM

## 2021-04-22 RX ORDER — BUPIVACAINE HYDROCHLORIDE AND EPINEPHRINE 5; 5 MG/ML; UG/ML
INJECTION, SOLUTION EPIDURAL; INTRACAUDAL; PERINEURAL AS NEEDED
Status: DISCONTINUED | OUTPATIENT
Start: 2021-04-22 | End: 2021-04-22 | Stop reason: HOSPADM

## 2021-04-22 RX ORDER — SODIUM CHLORIDE, SODIUM LACTATE, POTASSIUM CHLORIDE, CALCIUM CHLORIDE 600; 310; 30; 20 MG/100ML; MG/100ML; MG/100ML; MG/100ML
125 INJECTION, SOLUTION INTRAVENOUS CONTINUOUS
Status: DISCONTINUED | OUTPATIENT
Start: 2021-04-22 | End: 2021-04-22 | Stop reason: HOSPADM

## 2021-04-22 RX ORDER — LIDOCAINE HYDROCHLORIDE 20 MG/ML
INJECTION, SOLUTION EPIDURAL; INFILTRATION; INTRACAUDAL; PERINEURAL AS NEEDED
Status: DISCONTINUED | OUTPATIENT
Start: 2021-04-22 | End: 2021-04-22 | Stop reason: HOSPADM

## 2021-04-22 RX ORDER — GLYCOPYRROLATE 0.2 MG/ML
INJECTION INTRAMUSCULAR; INTRAVENOUS AS NEEDED
Status: DISCONTINUED | OUTPATIENT
Start: 2021-04-22 | End: 2021-04-22 | Stop reason: HOSPADM

## 2021-04-22 RX ORDER — DEXAMETHASONE SODIUM PHOSPHATE 4 MG/ML
INJECTION, SOLUTION INTRA-ARTICULAR; INTRALESIONAL; INTRAMUSCULAR; INTRAVENOUS; SOFT TISSUE AS NEEDED
Status: DISCONTINUED | OUTPATIENT
Start: 2021-04-22 | End: 2021-04-22 | Stop reason: HOSPADM

## 2021-04-22 RX ORDER — ONDANSETRON 2 MG/ML
INJECTION INTRAMUSCULAR; INTRAVENOUS AS NEEDED
Status: DISCONTINUED | OUTPATIENT
Start: 2021-04-22 | End: 2021-04-22 | Stop reason: HOSPADM

## 2021-04-22 RX ORDER — PROPOFOL 10 MG/ML
INJECTION, EMULSION INTRAVENOUS AS NEEDED
Status: DISCONTINUED | OUTPATIENT
Start: 2021-04-22 | End: 2021-04-22 | Stop reason: HOSPADM

## 2021-04-22 RX ORDER — CEPHALEXIN 500 MG/1
500 CAPSULE ORAL 4 TIMES DAILY
COMMUNITY

## 2021-04-22 RX ORDER — CEFAZOLIN SODIUM/WATER 2 G/20 ML
2 SYRINGE (ML) INTRAVENOUS ONCE
Status: COMPLETED | OUTPATIENT
Start: 2021-04-22 | End: 2021-04-22

## 2021-04-22 RX ADMIN — DEXAMETHASONE SODIUM PHOSPHATE 4 MG: 4 INJECTION, SOLUTION INTRAMUSCULAR; INTRAVENOUS at 11:01

## 2021-04-22 RX ADMIN — SODIUM CHLORIDE, SODIUM LACTATE, POTASSIUM CHLORIDE, AND CALCIUM CHLORIDE: 600; 310; 30; 20 INJECTION, SOLUTION INTRAVENOUS at 11:06

## 2021-04-22 RX ADMIN — GLYCOPYRROLATE 0.2 MG: 0.2 INJECTION INTRAMUSCULAR; INTRAVENOUS at 10:56

## 2021-04-22 RX ADMIN — PROPOFOL 100 MG: 10 INJECTION, EMULSION INTRAVENOUS at 10:45

## 2021-04-22 RX ADMIN — SODIUM CHLORIDE, SODIUM LACTATE, POTASSIUM CHLORIDE, AND CALCIUM CHLORIDE 125 ML/HR: 600; 310; 30; 20 INJECTION, SOLUTION INTRAVENOUS at 08:25

## 2021-04-22 RX ADMIN — FENTANYL CITRATE 100 MCG: 50 INJECTION, SOLUTION INTRAMUSCULAR; INTRAVENOUS at 10:36

## 2021-04-22 RX ADMIN — ONDANSETRON HYDROCHLORIDE 4 MG: 2 INJECTION INTRAMUSCULAR; INTRAVENOUS at 11:01

## 2021-04-22 RX ADMIN — SODIUM CHLORIDE, SODIUM LACTATE, POTASSIUM CHLORIDE, AND CALCIUM CHLORIDE: 600; 310; 30; 20 INJECTION, SOLUTION INTRAVENOUS at 10:36

## 2021-04-22 RX ADMIN — CEFAZOLIN 2 G: 1 INJECTION, POWDER, FOR SOLUTION INTRAVENOUS at 10:51

## 2021-04-22 RX ADMIN — LIDOCAINE HYDROCHLORIDE 100 MG: 20 INJECTION, SOLUTION EPIDURAL; INFILTRATION; INTRACAUDAL; PERINEURAL at 10:45

## 2021-04-22 NOTE — PERIOP NOTES
Reviewed PTA medication list with patient/caregiver and patient/caregiver denies any additional medications. Patient admits to having a responsible adult care for them at home for at least 24 hours after surgery. Patient encouraged to use gown warming system and informed that using said warming gown to regulate body temperature prior to a procedure has been shown to help reduce the risks of blood clots and infection. Patient's pharmacy of choice verified and documented in PTA medication section. Dual skin assessment & fall risk band verification completed with Jerson Carlin RN.

## 2021-04-22 NOTE — PERIOP NOTES
Discharge instructions completed to [facility] via phone call - opportunity for questions - AVS med list reviewed for duplicates

## 2021-04-22 NOTE — ANESTHESIA POSTPROCEDURE EVALUATION
Procedure(s):  DEBRIDEMENT (SHARP EXCISIONAL) OF LEFT CHEST WALL WOUND.    general    Anesthesia Post Evaluation      Multimodal analgesia: multimodal analgesia not used between 6 hours prior to anesthesia start to PACU discharge  Patient location during evaluation: PACU  Patient participation: complete - patient participated  Level of consciousness: awake  Pain score: 0  Airway patency: patent  Anesthetic complications: no  Cardiovascular status: acceptable  Respiratory status: acceptable  Hydration status: acceptable  Post anesthesia nausea and vomiting:  none  Final Post Anesthesia Temperature Assessment:  Normothermia (36.0-37.5 degrees C)      INITIAL Post-op Vital signs:   Vitals Value Taken Time   /47 04/22/21 1230   Temp 36.2 °C (97.2 °F) 04/22/21 1125   Pulse 86 04/22/21 1232   Resp 10 04/22/21 1232   SpO2 94 % 04/22/21 1232   Vitals shown include unvalidated device data.

## 2021-04-22 NOTE — PROGRESS NOTES
D/C Plan: [facility] and physician follow up     CM has been notified pt will require [place] for dressing changes upon discharge. CM contacted pt's , Mr. Vy Willingham ([phone]), to discuss transition of care options/recommendations. Pt has used [facility] in the past and pt/family are agreeable to using this agency again. CMS has been notified to assist.  No other transition of care needs have been identified at this time. CM remains available to assist as needed.

## 2021-04-22 NOTE — DISCHARGE INSTRUCTIONS
Follow all of Dr. Alpa Kumar discharge instructions  Contact Dr. Alpa Kumar office with any Post op questions or concerns   Follow up with Dr. Alpa Kumar in 1 - 2 weeks    Ambulate in house  Rest elevated  Sponge bath until first follow up visit  Resume pre hospital diet  Drink plenty of fluids  Avoid heavy lifting , pushing or pulling     has spoken with your spouse - you will require Home Health to assist with dressing changes        DISCHARGE SUMMARY from Nurse    PATIENT INSTRUCTIONS:    After general anesthesia or intravenous sedation, for 24 hours or while taking prescription Narcotics:  · Limit your activities  · Do not drive and operate hazardous machinery  · Do not make important personal or business decisions  · Do  not drink alcoholic beverages  · If you have not urinated within 8 hours after discharge, please contact your surgeon on call. Report the following to your surgeon:  · Excessive pain, swelling, redness or odor of or around the surgical area  · Temperature over 100.5  · Nausea and vomiting lasting longer than 4 hours or if unable to take medications  · Any signs of decreased circulation or nerve impairment to extremity: change in color, persistent  numbness, tingling, coldness or increase pain  · Any questions    What to do at Home:  Recommended activity: Ambulate in house, No driving while on analgesics and No heavy lifting until advised     If you experience any of the following symptoms fever, chills, uncontrollable pain, active bleeding or drainage , nausea or vomiting, please follow up with Dr. Alpa Kumar.    *  Please give a list of your current medications to your Primary Care Provider. *  Please update this list whenever your medications are discontinued, doses are      changed, or new medications (including over-the-counter products) are added. *  Please carry medication information at all times in case of emergency situations.     These are general instructions for a healthy lifestyle:    No smoking/ No tobacco products/ Avoid exposure to second hand smoke  Surgeon General's Warning:  Quitting smoking now greatly reduces serious risk to your health. Obesity, smoking, and sedentary lifestyle greatly increases your risk for illness    A healthy diet, regular physical exercise & weight monitoring are important for maintaining a healthy lifestyle    You may be retaining fluid if you have a history of heart failure or if you experience any of the following symptoms:  Weight gain of 3 pounds or more overnight or 5 pounds in a week, increased swelling in our hands or feet or shortness of breath while lying flat in bed. Please call your doctor as soon as you notice any of these symptoms; do not wait until your next office visit. Patient armband removed and shredded    The discharge information has been reviewed with the patient and caregiver. The patient and caregiver verbalized understanding. Discharge medications reviewed with the patient and caregiver and appropriate educational materials and side effects teaching were provided. Learning About COVID-19 and Social Distancing  What is it? Social distancing means putting space between yourself and other people. The recommended distance is 6 feet, or about 2 meters. This also means staying away from any place where people may gather, such as [place] or other public gathering places. Why is it important? Social distancing is the best way to reduce the spread of COVID-19. This virus seems to spread from person to person through droplets from coughing and sneezing. So if you keep your distance from others, you're less likely to get it or spread it. And social distancing is important for everyone, not just those who are at high risk of infection, like older people. You might have the virus but not have symptoms. You could then give the infection to someone you come into contact with. How is it done?   Putting 6 feet, or about 2 meters, between you and other people is the recommended distance. Also stay away from any place where people may gather, such as [place] or other public gathering places. So if possible:  · Work from home, and keep your kids at home. · Don't travel if you don't have to. And avoid public transportation, ride-shares, and taxis unless you have no choice. · Limit shopping to essentials, like food and medicines. · Wear a cloth face cover if you have to go to a public place like the grocery store or pharmacy. · Don't eat in restaurants. (You can still get takeout or food deliveries.)  · Avoid crowds and busy places. Follow stay-at-home orders or other directions for your area. Where can you learn more? Go to http://www.gray.com/  Enter A133 in the search box to learn more about \"Learning About COVID-19 and Social Distancing. \"  Current as of: December 18, 2020               Content Version: 12.8  © 0729-6224 [employer], [facility]. Care instructions adapted under license by [employer] (which disclaims liability or warranty for this information). If you have questions about a medical condition or this instruction, always ask your healthcare professional. [address] any warranty or liability for your use of this information.     ___________________________________________________________________________________________________________________________________

## 2021-04-22 NOTE — INTERVAL H&P NOTE
Update History & Physical    The Patient's History and Physical of April 22, 2021 was reviewed with the patient and I examined the patient. There was no change. The surgical site was confirmed by the patient and me. Plan:  The risk, benefits, expected outcome, and alternative to the recommended procedure have been discussed with the patient. Patient understands and wants to proceed with the procedure.     Electronically signed by Jonh Rain MD on 4/22/2021 at 10:20 AM

## 2021-04-22 NOTE — PERIOP NOTES
Patient states she has macular degeneration but is able to see in distance just not very well up close, glasses at home.

## 2021-04-22 NOTE — PERIOP NOTES
Discharge instructions reviewed with pt - verbalizes understanding - tolerating po fluids - denies c/o pain - will plan for discharge

## 2021-04-22 NOTE — PERIOP NOTES
Upon transfer from wheelchair to recliner, small skin tear noted to patient's right lower leg. First aid provided 2X2 and paper tape applied.

## 2021-04-22 NOTE — BRIEF OP NOTE
Brief Postoperative Note    Patient: Melissa Light  YOB: 1940  MRN: [id number]    Date of Procedure: 4/22/2021     Pre-Op Diagnosis: Dehiscence/Necrosis of left chest wall surgical wound ([address] [I97.89, I96]    Post-Op Diagnosis: Same as preoperative diagnosis.       Procedure(s):  DEBRIDEMENT (SHARP EXCISIONAL) OF LEFT CHEST WALL WOUND (SKIN/SUBCUTANEOUS)    Surgeon(s):  Wes Sandifer, MD    Surgical Assistant: Surg Asst-1: Martha Benitez    Anesthesia: Other     Estimated Blood Loss (mL): Minimal    Complications: None    Specimens:   ID Type Source Tests Collected by Time Destination   1 : left chest debrided chest wall skin and subcutaneous tissue Preservative Chest  Wes Sandifer, MD 4/22/2021 1110 Pathology        Implants: * No implants in log *    Drains: * No LDAs found *    Findings: necrotic skin/SQ tissue along left mastectomy incision    Electronically Signed by Primitivo Harris MD on 4/22/2021 at 12:02 PM    Op note dictated #[id number]  RP

## 2021-04-22 NOTE — ANESTHESIA PREPROCEDURE EVALUATION
Relevant Problems   No relevant active problems       Anesthetic History   No history of anesthetic complications            Review of Systems / Medical History  Patient summary reviewed and pertinent labs reviewed    Pulmonary          Smoker    Pertinent negatives: No sleep apnea  Comments: Did not abstain from smoking   Neuro/Psych       CVA: no residual symptoms  TIA     Cardiovascular    Hypertension            Pertinent negatives: No CAD       GI/Hepatic/Renal           PUD  Pertinent negatives: No hiatal hernia and GERD   Endo/Other  Within defined limits           Other Findings              Physical Exam    Airway  Mallampati: III  TM Distance: > 6 cm  Neck ROM: normal range of motion   Mouth opening: Normal     Cardiovascular    Rhythm: regular  Rate: normal         Dental    Dentition: Poor dentition     Pulmonary  Breath sounds clear to auscultation               Abdominal  GI exam deferred       Other Findings            Anesthetic Plan    ASA: 3  Anesthesia type: general          Induction: Intravenous  Anesthetic plan and risks discussed with: Patient

## 2021-04-23 NOTE — OP NOTES
[facility]  OPER[facility]    Name:  Cordell Rudolph  MR#:   [id number]  :  1940  ACCOUNT #:  [de-identified]  DATE OF SERVICE:  2021    PREOPERATIVE DIAGNOSES:  Left mastectomy wound dehiscence with necrosis involving skin and subcutaneous tissue. POSTOPERATIVE DIAGNOSES:  Left mastectomy wound dehiscence with necrosis involving skin and subcutaneous tissue. PROCEDURE PERFORMED:  Sharp excisional debridement of left chest wall wound including necrotic skin and subcutaneous tissue (less than 20 cm2 debrided). SURGEON:  Rizwana Urbano MD    ASSISTANT:  None. ANESTHESIA:  General.    COMPLICATIONS:  None. SPECIMENS REMOVED:  Debrided left chest wall skin/subcutaneous tissue, for permanent pathology. DRAINS/IMPLANTS:  None. ESTIMATED BLOOD LOSS:  Minimal.    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 She was doing well on her initial postoperative visit, but on her subsequent visits she had some necrosis and epidermal sloughing at the medial aspect as well as some superficial dehiscence involving the lateral aspect. Recommended debridement followed by ongoing home health/local wound care. Discussed with her and her  the nature of the surgery, alternatives, benefits, and risks. She gave consent to proceed. PROCEDURE:  She was taken to the OR on 2021, met and identified in the preoperative holding area. Surgical site was marked and brought into the operating room. Preoperative intravenous Ancef 2 g was given per protocol. She was positioned on the table with all pressure points appropriately padded, arm extended on a padded armboard at 90 degrees. General anesthesia was administered. Sequential compression devices were applied. The area was prepped and draped in the usual sterile fashion. After a surgical pause, began with a 15-blade to sharply excise/debride the obvious necrotic skin/subcutaneous tissue at the medial aspect, continued excising the skin margin along the entire mastectomy incision to get back to healthy bleeding and viable tissue. All of this was done with sharp 15-blade excisional dissection. Once we were back to viable healthy granulation tissue and skin edges, we concluded the procedure, less than 20 cm2 of debrided skin/subcutaneous tissue was sent for permanent pathology. Hemostasis was achieved with Bovie cauterization with any discrete bleeding points. Wound was packed open with 2-inch Marshall moistened with diluted Betadine and Marcaine with epinephrine. This was then covered with 4x4, fluffs, and ABD pad. Hemostasis was satisfactory throughout the entire procedure. The patient tolerated the entire procedure without incident. She was extubated in the OR and taken to the recovery room postoperatively in stable condition.       Krystyna Doran MD      [id number]  D:  04/22/2021 12:00  T:  04/22/2021 20:15  JOB #:  [id number]  CC:  Angela Bolden MD

## (undated) DEVICE — MINOR: Brand: MEDLINE INDUSTRIES, INC.

## (undated) DEVICE — GAUZE,SPONGE,FLUFF,6"X6.75",STRL,10/TRAY: Brand: MEDLINE

## (undated) DEVICE — SUT MONOCRYL PLUS UD 4-0 --

## (undated) DEVICE — STRIP,CLOSURE,WOUND,MEDI-STRIP,1/2X4: Brand: MEDLINE

## (undated) DEVICE — DERMABOND SKIN ADH 0.7ML --

## (undated) DEVICE — STERILE POLYISOPRENE POWDER-FREE SURGICAL GLOVES WITH EMOLLIENT COATING: Brand: PROTEXIS

## (undated) DEVICE — SPONGE GZ W4XL4IN COT 12 PLY TYP VII WVN C FLD DSGN

## (undated) DEVICE — 3-0 COATED VICRYL PLUS UNDYED 1X27" SH --

## (undated) DEVICE — SPONGE DRAIN NONWOVEN 4X4IN -- 2/PK

## (undated) DEVICE — GARMENT,MEDLINE,DVT,INT,CALF,MED, GEN2: Brand: MEDLINE

## (undated) DEVICE — SHEET,DRAPE,40X58,STERILE: Brand: MEDLINE

## (undated) DEVICE — SUT SLK 2-0 18IN FS BLK --

## (undated) DEVICE — APPLIER LIG CLP M L11IN TI STR RNG HNDL FOR 20 CLP DISP

## (undated) DEVICE — HEAD REST BAGEL: Brand: DEVON

## (undated) DEVICE — (D)PREP SKN CHLRAPRP APPL 26ML -- CONVERT TO ITEM 371833

## (undated) DEVICE — HEX-LOCKING BLADE ELECTRODE: Brand: EDGE

## (undated) DEVICE — COVER US PRB W4XL15IN GAM CRDLSS FLD

## (undated) DEVICE — SPONGE LAP 18X18IN STRL -- 5/PK

## (undated) DEVICE — RESERVOIR,SUCTION,100CC,SILICONE: Brand: MEDLINE

## (undated) DEVICE — INSULATED BLADE ELECTRODE: Brand: EDGE

## (undated) DEVICE — SHEAR HARMONIC FOCUS OEM 9CM --

## (undated) DEVICE — APPLIER CLP L9.375IN APER 2.1MM CLS L3.8MM 20 SM TI CLP

## (undated) DEVICE — SUTURE VCRL SZ 3-0 L27IN ABSRB UD L26MM SH 1/2 CIR J416H

## (undated) DEVICE — NEEDLE HYPO 25GA L1.5IN BLU POLYPR HUB S STL REG BVL STR

## (undated) DEVICE — SOL IRRIGATION INJ NACL 0.9% 500ML BTL

## (undated) DEVICE — SYR 3ML LL TIP 1/10ML GRAD --

## (undated) DEVICE — AEGIS 1" DISK 4MM HOLE, PEEL OPEN: Brand: MEDLINE

## (undated) DEVICE — SUTURE MCRYL SZ 3-0 L27IN ABSRB UD L26MM SH 1/2 CIR Y416H

## (undated) DEVICE — SUT ETHLN 3-0 18IN PS2 BLK --

## (undated) DEVICE — BRA SURG POST-OP LG 42IN --

## (undated) DEVICE — SUT SLK 3-0 30IN SH BLK --

## (undated) DEVICE — TOWEL,OR,DSP,ST,BLUE,STD,4/PK,20PK/CS: Brand: MEDLINE

## (undated) DEVICE — GOWN,SIRUS,NONRNF,SETINSLV,XL,20/CS: Brand: MEDLINE

## (undated) DEVICE — 4-PORT MANIFOLD: Brand: NEPTUNE 2

## (undated) DEVICE — STERILE POLYISOPRENE POWDER-FREE SURGICAL GLOVES: Brand: PROTEXIS

## (undated) DEVICE — REM POLYHESIVE ADULT PATIENT RETURN ELECTRODE: Brand: VALLEYLAB

## (undated) DEVICE — MASTISOL ADHESIVE LIQ 2/3ML

## (undated) DEVICE — 3M™ TEGADERM™ TRANSPARENT FILM DRESSING FRAME STYLE, 1626W, 4 IN X 4-3/4 IN (10 CM X 12 CM), 50/CT 4CT/CASE: Brand: 3M™ TEGADERM™

## (undated) DEVICE — DRAIN SURG 19FR 0.25IN SIL RND W/ TRCR INDIC DOT RADPQ FULL

## (undated) DEVICE — DRAPE,CHEST,FENES,15X10,STERIL: Brand: MEDLINE

## (undated) DEVICE — NDL PRT INJ NSAF BLNT 18GX1.5 --